# Patient Record
Sex: MALE | Race: BLACK OR AFRICAN AMERICAN | Employment: OTHER | URBAN - METROPOLITAN AREA
[De-identification: names, ages, dates, MRNs, and addresses within clinical notes are randomized per-mention and may not be internally consistent; named-entity substitution may affect disease eponyms.]

---

## 2017-01-24 ENCOUNTER — HOSPITAL ENCOUNTER (OUTPATIENT)
Dept: LAB | Age: 82
Discharge: HOME | End: 2017-01-24
Payer: MEDICARE

## 2017-01-24 ENCOUNTER — CLINICAL SUPPORT (OUTPATIENT)
Dept: UROLOGY | Age: 82
End: 2017-01-24

## 2017-01-24 VITALS
WEIGHT: 168 LBS | BODY MASS INDEX: 24.88 KG/M2 | DIASTOLIC BLOOD PRESSURE: 70 MMHG | TEMPERATURE: 97 F | SYSTOLIC BLOOD PRESSURE: 155 MMHG | HEIGHT: 69 IN | OXYGEN SATURATION: 99 % | HEART RATE: 74 BPM

## 2017-01-24 DIAGNOSIS — C61 PROSTATE CANCER (HCC): ICD-10-CM

## 2017-01-24 DIAGNOSIS — C61 PROSTATE CANCER (HCC): Primary | ICD-10-CM

## 2017-01-24 LAB
BILIRUB UR QL STRIP: NEGATIVE
GLUCOSE UR-MCNC: NEGATIVE MG/DL
KETONES P FAST UR STRIP-MCNC: NEGATIVE MG/DL
PH UR STRIP: 6 [PH] (ref 4.6–8)
PROT UR QL STRIP: NEGATIVE MG/DL
PSA SERPL-MCNC: <0.1 NG/ML (ref 0–4)
SP GR UR STRIP: 1 (ref 1–1.03)
UA UROBILINOGEN AMB POC: NORMAL (ref 0.2–1)
URINALYSIS CLARITY POC: CLEAR
URINALYSIS COLOR POC: YELLOW
URINE BLOOD POC: NEGATIVE
URINE LEUKOCYTES POC: NEGATIVE
URINE NITRITES POC: NEGATIVE

## 2017-01-24 PROCEDURE — 84153 ASSAY OF PSA TOTAL: CPT | Performed by: UROLOGY

## 2017-01-24 NOTE — MR AVS SNAPSHOT
Visit Information Date & Time Provider Department Dept. Phone Encounter #  
 1/24/2017  1:30 PM Cherylynn Dance, [facility] [phone] Upcoming Health Maintenance Date Due DTaP/Tdap/Td series (1 - Tdap) [birth year]/21/1952 ZOSTER VACCINE AGE 60> 6/21/1991 GLAUCOMA SCREENING Q2Y 6/21/1996 Pneumococcal 65+ High/Highest Risk (1 of 2 - PCV13) 6/21/1996 MEDICARE YEARLY EXAM 6/21/1996 INFLUENZA AGE 9 TO ADULT 8/1/2016 Allergies as of 1/24/2017  Review Complete On: 1/24/2017 By: Freida Nicholson LPN Severity Noted Reaction Type Reactions Nasacort [Triamcinolone Acetonide]  04/27/2011    Other (comments) Other reaction(s): other/intolerance Other reaction(s): other/intolerance Nose Bleeds Current Immunizations  Never Reviewed No immunizations on file. Not reviewed this visit You Were Diagnosed With   
  
 Codes Comments Prostate cancer [facility])    -  Primary ICD-10-CM: C06 ICD-9-CM: 686 Vitals BP Pulse Temp Height(growth percentile) Weight(growth percentile) SpO2  
 155/70 (BP 1 Location: Left arm, BP Patient Position: Sitting) 74 97 °F (36.1 °C) 5' 9\" (1.753 m) 168 lb (76.2 kg) 99% BMI Smoking Status 24.81 kg/m2 Former Smoker Vitals History BMI and BSA Data Body Mass Index Body Surface Area  
 24.81 kg/m 2 1.93 m 2 Preferred Pharmacy Pharmacy Name Phone [facility], [address] [place] [address] [phone] Your Updated Medication List  
  
   
This list is accurate as of: 1/24/17  1:51 PM.  Always use your most recent med list.  
  
  
  
  
 bicalutamide 50 mg tablet Commonly known as:  CASODEX  
take 1 tablet by mouth once daily CeleBREX 100 mg capsule Generic drug:  celecoxib Take  by mouth two (2) times a day. dilTIAZem 360 mg ER capsule Commonly known as:  CARDIZEM CD  
 TAKE ONE CAPSULE BY MOUTH EVERY MORNING  
  
 loratadine 10 mg tablet Commonly known as:  Toro Jordy Take 10 mg by mouth as needed. LORazepam 1 mg tablet Commonly known as:  ATIVAN Take 1 mg by mouth every eight (8) hours as needed. MIRALAX PO Take  by mouth as needed. potassium chloride 20 mEq packet Commonly known as:  KLOR-CON Take 20 mEq by mouth daily. triamcinolone 55 mcg nasal inhaler Commonly known as:  NASACORT AQ  
2 Sprays daily. ZOCOR 10 mg tablet Generic drug:  simvastatin Take 20 mg by mouth nightly. We Performed the Following AMB POC URINALYSIS DIP STICK AUTO W/O MICRO [64109 CPT(R)] OR COLLECTION VENOUS BLOOD,VENIPUNCTURE L1853034 CPT(R)] To-Do List   
 2017 Lab:  [facility] DIAGNOSTIC (PROSTATIC SPECIFIC AG) Patient Instructions MyChart Activation Thank you for requesting access to [facility]. Please follow the instructions below to securely access and download your online medical record. [facility] allows you to send messages to your doctor, view your test results, renew your prescriptions, schedule appointments, and more. How Do I Sign Up? 1. In your internet browser, go to [email or web address] [email or web address]. 2. Click on the First Time User? Click Here link in the Sign In box. You will see the New Member Sign Up page. 3. Enter your [facility] Access Code exactly as it appears below. You will not need to use this code after youve completed the sign-up process. If you do not sign up before the expiration date, you must request a new code. [facility] Access Code: [email or web address] Expires: 2017  1:25 PM (This is the date your [facility] access code will ) 4. Enter the last four digits of your Social Security Number (xxxx) and Date of Birth (mm/dd/yyyy) as indicated and click Submit. You will be taken to the next sign-up page. 5. Create a [employer] ID. This will be your [employer] login ID and cannot be changed, so think of one that is secure and easy to remember. 6. Create a [employer] password. You can change your password at any time. 7. Enter your Password Reset Question and Answer. This can be used at a later time if you forget your password. 8. Enter your e-mail address. You will receive e-mail notification when new information is available in [address] 9. Click Sign Up. You can now view and download portions of your medical record. 10. Click the Download Summary menu link to download a portable copy of your medical information. Additional Information If you have questions, please visit the Frequently Asked Questions section of the [employer] website at [email or web address] Remember, [employer] is NOT to be used for urgent needs. For medical emergencies, dial 911. Leuprolide (By injection) Leuprolide (DDS-hbzx-qlpi) Treats endometriosis, uterine fibroids, and premature puberty. Also treats symptoms of prostate cancer. Brand Name(s):Eligard, Lupaneta Pack, Lupron Depot, Lupron Depot-Ped There may be other brand names for this medicine. When This Medicine Should Not Be Used: This medicine is not right for everyone. You should not receive it if you had an allergic reaction to leuprolide or similar medicines, or if you are pregnant or breastfeeding. How to Use This Medicine:  
Injectable · Your doctor will prescribe your exact dose and schedule. This medicine is given as a shot into a muscle or under the skin. Leuprolide injection is given on different schedules for different conditions. It might be given every day, once a month, or every few months. · A nurse or other health provider will give you this medicine. · You may be taught how to give your medicine at home. Make sure you understand all instructions before giving yourself an injection.  Do not use more medicine or use it more often than your doctor tells you to. · You will be shown the body areas where this shot can be given. Use a different body area each time you give yourself a shot. Keep track of where you give each shot to make sure you rotate body areas. · Use a new needle and syringe each time you inject your medicine. · Read and follow the patient instructions that come with this medicine. Talk to your doctor or pharmacist if you have any questions. · Missed dose: This medicine needs to be given on a fixed schedule. If you miss a dose, call your doctor, home health caregiver, or treatment clinic for instructions. · If you store this medicine at home, keep it at room temperature, away from heat, moisture, and direct light. Drugs and Foods to Avoid: Ask your doctor or pharmacist before using any other medicine, including over-the-counter medicines, vitamins, and herbal products. · Some foods and medicines can affect how leuprolide works. Tell your doctor if you are using the following: ¨ Medicine to treat heart rhythm problems ¨ Medicine to treat depression (such as bupropion) ¨ Medicine to treat seizures ¨ Steroid medicine (such as hydrocortisone, methylprednisolone, prednisone, prednisolone, or dexamethasone) Warnings While Using This Medicine: · It is not safe to take this medicine during pregnancy. It could harm an unborn baby. Tell your doctor right away if you become pregnant. Women who are using this medicine should use birth control that does not contain hormones. · Tell your doctor if you have kidney disease, heart failure, diabetes, heart or blood vessel disease, heart rhythm problems (such as long QT syndrome), problems with your nervous system, or a history of depression, seizures, or stroke. · Women: Your menstrual periods should stop, but you might have light bleeding or spotting. If you continue to have heavy bleeding or regular periods, call your doctor. · This medicine may cause the following problems: 
¨ Heart rhythm changes ¨ Weaker bones, which may lead to osteoporosis ¨ Problems with the urinary tract or spinal cord (in men) ¨ Changes in blood sugar levels (in men) ¨ Higher risk of heart attack or stroke (in men) · Your symptoms might get worse when you first start using this medicine, but then they should get better as the medicine starts to work. If your condition does not begin to improve after 2 weeks, call your doctor. · Tell any doctor or dentist who treats you that you are using this medicine. This medicine may affect certain medical test results. · Your doctor will check your progress and the effects of this medicine at regular visits. Keep all appointments. · Keep all medicine out of the reach of children. Never share your medicine with anyone. Possible Side Effects While Using This Medicine:  
Call your doctor right away if you notice any of these side effects: · Allergic reaction: Itching or hives, swelling in your face or hands, swelling or tingling in your mouth or throat, chest tightness, trouble breathing · Change in how much or how often you urinate · Depression or severe moodiness or emotional problems · Fast, pounding, or uneven heart beat · Heavy vaginal bleeding · Seizures · Unusual or severe bone or back pain If you notice these less serious side effects, talk with your doctor:  
· Headache · Hot flashes and sweating, warmth or redness in your face, neck, arms, or upper chest 
· Loss of interest in sex, sexual problems · Moodiness · Pain, itching, burning, bruises, or swelling where the shot was given If you notice other side effects that you think are caused by this medicine, tell your doctor. Call your doctor for medical advice about side effects. You may report side effects to FDA at 8-125-FDA-8750 © 2016 [address] is for End User's use only and may not be sold, redistributed or otherwise used for commercial purposes. The above information is an  only. It is not intended as medical advice for individual conditions or treatments. Talk to your doctor, nurse or pharmacist before following any medical regimen to see if it is safe and effective for you. Introducing [facility]! Verito Strickland introduces [facility] patient portal. Now you can access parts of your medical record, email your doctor's office, and request medication refills online. 1. In your internet browser, go to [email or web address] 2. Click on the First Time User? Click Here link in the Sign In box. You will see the New Member Sign Up page. 3. Enter your [employer] Access Code exactly as it appears below. You will not need to use this code after youve completed the sign-up process. If you do not sign up before the expiration date, you must request a new code. · [facility] Access Code: [email or web address] Expires: 4/24/2017  1:25 PM 
 
4. Enter the last four digits of your Social Security Number (xxxx) and Date of Birth (mm/dd/yyyy) as indicated and click Submit. You will be taken to the next sign-up page. 5. Create a [employer] ID. This will be your [facility] login ID and cannot be changed, so think of one that is secure and easy to remember. 6. Create a [employer] password. You can change your password at any time. 7. Enter your Password Reset Question and Answer. This can be used at a later time if you forget your password. 8. Enter your e-mail address. You will receive e-mail notification when new information is available in [address] 9. Click Sign Up. You can now view and download portions of your medical record. 10. Click the Download Summary menu link to download a portable copy of your medical information.  
 
If you have questions, please visit the Frequently Asked Questions section of the [employer]. Remember, [employer] is NOT to be used for urgent needs. For medical emergencies, dial 911. Now available from your iPhone and Android! Please provide this summary of care documentation to your next provider. Your primary care clinician is listed as Aniya Santiago. If you have any questions after today's visit, please call [phone].

## 2017-01-24 NOTE — PATIENT INSTRUCTIONS
[facility] Activation    Thank you for requesting access to [facility]. Please follow the instructions below to securely access and download your online medical record. [facility] allows you to send messages to your doctor, view your test results, renew your prescriptions, schedule appointments, and more. How Do I Sign Up? 1. In your internet browser, go to [email or web address]. 2. Click on the First Time User? Click Here link in the Sign In box. You will see the New Member Sign Up page. 3. Enter your [facility] Access Code exactly as it appears below. You will not need to use this code after youve completed the sign-up process. If you do not sign up before the expiration date, you must request a new code. [facility] Access Code: [email or web address]  Expires: 2017  1:25 PM (This is the date your [facility] access code will )    4. Enter the last four digits of your Social Security Number (xxxx) and Date of Birth (mm/dd/yyyy) as indicated and click Submit. You will be taken to the next sign-up page. 5. Create a [facility] ID. This will be your [facility] login ID and cannot be changed, so think of one that is secure and easy to remember. 6. Create a [facility] password. You can change your password at any time. 7. Enter your Password Reset Question and Answer. This can be used at a later time if you forget your password. 8. Enter your e-mail address. You will receive e-mail notification when new information is available in [address] 9. Click Sign Up. You can now view and download portions of your medical record. 10. Click the Download Summary menu link to download a portable copy of your medical information. Additional Information    If you have questions, please visit the Frequently Asked Questions section of the [facility] website at [email or web address] Remember, [facility] is NOT to be used for urgent needs. For medical emergencies, dial 911.         Leuprolide (By injection) Leuprolide (KLF-gobo-hwdm)  Treats endometriosis, uterine fibroids, and premature puberty. Also treats symptoms of prostate cancer. Brand Name(s):Eligard, Lupaneta Pack, Lupron Depot, Lupron Depot-Ped   There may be other brand names for this medicine. When This Medicine Should Not Be Used: This medicine is not right for everyone. You should not receive it if you had an allergic reaction to leuprolide or similar medicines, or if you are pregnant or breastfeeding. How to Use This Medicine:   Injectable  · Your doctor will prescribe your exact dose and schedule. This medicine is given as a shot into a muscle or under the skin. Leuprolide injection is given on different schedules for different conditions. It might be given every day, once a month, or every few months. · A nurse or other health provider will give you this medicine. · You may be taught how to give your medicine at home. Make sure you understand all instructions before giving yourself an injection. Do not use more medicine or use it more often than your doctor tells you to. · You will be shown the body areas where this shot can be given. Use a different body area each time you give yourself a shot. Keep track of where you give each shot to make sure you rotate body areas. · Use a new needle and syringe each time you inject your medicine. · Read and follow the patient instructions that come with this medicine. Talk to your doctor or pharmacist if you have any questions. · Missed dose: This medicine needs to be given on a fixed schedule. If you miss a dose, call your doctor, home health caregiver, or treatment clinic for instructions. · If you store this medicine at home, keep it at room temperature, away from heat, moisture, and direct light. Drugs and Foods to Avoid:   Ask your doctor or pharmacist before using any other medicine, including over-the-counter medicines, vitamins, and herbal products.   · Some foods and medicines can affect how leuprolide works. Tell your doctor if you are using the following:  ¨ Medicine to treat heart rhythm problems  ¨ Medicine to treat depression (such as bupropion)  ¨ Medicine to treat seizures  ¨ Steroid medicine (such as hydrocortisone, methylprednisolone, prednisone, prednisolone, or dexamethasone)  Warnings While Using This Medicine:   · It is not safe to take this medicine during pregnancy. It could harm an unborn baby. Tell your doctor right away if you become pregnant. Women who are using this medicine should use birth control that does not contain hormones. · Tell your doctor if you have kidney disease, heart failure, diabetes, heart or blood vessel disease, heart rhythm problems (such as long QT syndrome), problems with your nervous system, or a history of depression, seizures, or stroke. · Women: Your menstrual periods should stop, but you might have light bleeding or spotting. If you continue to have heavy bleeding or regular periods, call your doctor. · This medicine may cause the following problems:  ¨ Heart rhythm changes  ¨ Weaker bones, which may lead to osteoporosis  ¨ Problems with the urinary tract or spinal cord (in men)  ¨ Changes in blood sugar levels (in men)  ¨ Higher risk of heart attack or stroke (in men)  · Your symptoms might get worse when you first start using this medicine, but then they should get better as the medicine starts to work. If your condition does not begin to improve after 2 weeks, call your doctor. · Tell any doctor or dentist who treats you that you are using this medicine. This medicine may affect certain medical test results. · Your doctor will check your progress and the effects of this medicine at regular visits. Keep all appointments. · Keep all medicine out of the reach of children. Never share your medicine with anyone.   Possible Side Effects While Using This Medicine:   Call your doctor right away if you notice any of these side effects:  · Allergic reaction: Itching or hives, swelling in your face or hands, swelling or tingling in your mouth or throat, chest tightness, trouble breathing  · Change in how much or how often you urinate  · Depression or severe moodiness or emotional problems  · Fast, pounding, or uneven heart beat  · Heavy vaginal bleeding  · Seizures  · Unusual or severe bone or back pain  If you notice these less serious side effects, talk with your doctor:   · Headache  · Hot flashes and sweating, warmth or redness in your face, neck, arms, or upper chest  · Loss of interest in sex, sexual problems  · Moodiness  · Pain, itching, burning, bruises, or swelling where the shot was given  If you notice other side effects that you think are caused by this medicine, tell your doctor. Call your doctor for medical advice about side effects. You may report side effects to FDA at 8-637-FDA-6349  © 2016 [address] is for End User's use only and may not be sold, redistributed or otherwise used for commercial purposes. The above information is an  only. It is not intended as medical advice for individual conditions or treatments. Talk to your doctor, nurse or pharmacist before following any medical regimen to see if it is safe and effective for you.

## 2017-01-24 NOTE — PROGRESS NOTES
Mr. Carl Buenrostro has a reminder for a \"due or due soon\" health maintenance. I have asked that he contact his primary care provider for follow-up on this health maintenance. [facility] per Dr. Pawan Murillo blood drawn in office today for PSA for Prostate CA.

## 2017-01-25 NOTE — PROGRESS NOTES
Mariano Rodrigez 80 y.o. male     Mr. Zara Soto seen today for prostate carcinoma [id number] on androgen ablation therapy with Lupron for PSA recurrence status post XRT in 2011-PSA 44.4 in May 2015 prompting initiation of androgen ablation therapy      Patient is fully active physically and has no complaints of bone pain or difficulty voiding        Now on Lupron androgen ablation for rising PSA-44.4 in may 2015-Patient is doing well and has no bone pain     Bone scan on 8 June 2015 negative for bony metastasis     CT scan imaging of the abdomen and pelvis on 14 July 2015 shows no signs of raina or bony metastasis     PSA-44.4 on 18 May 2015     Record review shows no notation of prostate biopsy      Patient was initially diagnosed with prostate carcinoma in 2008- followed in [facility] office by Dr. Marleni Franco who recommended external beam radiation therapy in 2011-this was declined by the patient After consultation with Dr. Marleni Franco and patient's family- citing belief he would not live long enough to develop metastatic disease-At present, he has irritative and obstructive voiding symptoms but no history of pain in ribs, spine, or limbs        PSA less than 0.1 in January 2016  PSA 4.8 and 2005  PSA 10.3 2008  PSA 12.1 2009- Definitive treatment with XRT advised by Dr. Azar Cuadra by patient and family  PSA 15.4 2011        Review of Systems:    CNS: No seizure syncope headaches or dizziness no visual changes  Respiratory: No wheezing or shortness of breath-Chronic cough  Cardiovascular:Hypertension-No chest pain or palpitations  Intestinal:GERD symptoms/constipation  Urinary: Irritative and obstructive voiding symptoms  Skeletal: Large joint arthritis  Endocrine:No diabetes or thyroid    Other:    Allergies:    Allergies   Allergen Reactions    Nasacort [Triamcinolone Acetonide] Other (comments)     Other reaction(s): other/intolerance  Other reaction(s): other/intolerance  Nose Bleeds Medications:    Current Outpatient Prescriptions   Medication Sig Dispense Refill    bicalutamide (CASODEX) 50 mg tablet take 1 tablet by mouth once daily 90 Tab 3    simvastatin (ZOCOR) 10 mg tablet Take 20 mg by mouth nightly.  celecoxib (CELEBREX) 100 mg capsule Take  by mouth two (2) times a day.  diltiazem (CARDIZEM CD) 360 mg ER capsule TAKE ONE CAPSULE BY MOUTH EVERY MORNING 90 Cap 3    potassium chloride (KLOR-CON) 20 mEq packet Take 20 mEq by mouth daily.  triamcinolone (NASACORT AQ) 55 mcg nasal inhaler 2 Sprays daily.  lorazepam (ATIVAN) 1 mg tablet Take 1 mg by mouth every eight (8) hours as needed.  POLYETHYLENE GLYCOL 3350 (MIRALAX PO) Take  by mouth as needed.  loratadine (CLARITIN) 10 mg tablet Take 10 mg by mouth as needed. Past Medical History   Diagnosis Date    Abnormal nuclear cardiac imaging test 04/29/2009     Mild-mod inferior, inferior septal ischemia w/mild scarring. Mild basal inferior, basal septal hypk. EF 55%. Mild TID 1.24. Neg EKG on max EST. Ex time 4 min.  Active tobacco     Allergic rhinitis     Arrhythmia     Arthritis     Atrial fibrillation (HCC)     CAD (coronary artery disease)      cardiomyopathy    Cardiomyopathy      nonischemic    Constipation     COPD     Dyslipidemia     History of echocardiogram 02/06/2013     EF 55-60%. No RWMA. Gr 1 DDfx. No significant valvular pathology.  Hypercholesterolemia     Hypertension     Hypertension     osteoarthritis     [facility] [address]     S/P cardiac cath 07/16/2009     Patent coronary arteries. LVEDP 10 mmHg. EF 40-45%. Mild global hypk.       Sick sinus syndrome ([facility] [address]      declined pacemaker      Past Surgical History   Procedure Laterality Date    Hx cataract removal       bilateral    Endoscopy, colon, diagnostic       cn he thinks 10-30 years ago    Hx orthopaedic       steel plate rt forearm tendon repair left hand     Family History   Problem Relation Age of Onset    Heart Attack Mother     Other Father      bleeding bowel problems        Physical Examination: Well-nourished mature male in no apparent distress    Urinalysis: Negative dipstick/nitrite negative    Impression: Castration responsive recurrent prostate carcinoma        Plan: Lupron 45 mg IM left buttock today    rtc 6 mo-Lupron injection      More than 1/2 of this 15 minute visit was spent in counselling and coordination of care, as described above. Mile Zhu MD  -electronically signed-    PLEASE NOTE:  This document has been produced using voice recognition software. Unrecognized errors in transcription may be present.

## 2017-07-08 RX ORDER — BICALUTAMIDE 50 MG/1
TABLET, FILM COATED ORAL
Qty: 90 TAB | Refills: 3 | Status: SHIPPED | OUTPATIENT
Start: 2017-07-08 | End: 2018-07-05 | Stop reason: SDUPTHER

## 2017-07-25 ENCOUNTER — HOSPITAL ENCOUNTER (OUTPATIENT)
Dept: LAB | Age: 82
Discharge: HOME | End: 2017-07-25
Payer: MEDICARE

## 2017-07-25 ENCOUNTER — CLINICAL SUPPORT (OUTPATIENT)
Dept: UROLOGY | Age: 82
End: 2017-07-25

## 2017-07-25 VITALS
OXYGEN SATURATION: 98 % | SYSTOLIC BLOOD PRESSURE: 120 MMHG | TEMPERATURE: 98.1 F | HEART RATE: 77 BPM | BODY MASS INDEX: 24.29 KG/M2 | HEIGHT: 69 IN | WEIGHT: 164 LBS | DIASTOLIC BLOOD PRESSURE: 80 MMHG

## 2017-07-25 DIAGNOSIS — C61 PROSTATE CANCER (HCC): Primary | ICD-10-CM

## 2017-07-25 DIAGNOSIS — C61 PROSTATE CANCER (HCC): ICD-10-CM

## 2017-07-25 LAB — PSA SERPL-MCNC: <0.1 NG/ML (ref 0–4)

## 2017-07-25 PROCEDURE — 84153 ASSAY OF PSA TOTAL: CPT | Performed by: UROLOGY

## 2017-07-25 RX ORDER — HYALURONATE SODIUM 10 MG/ML
25 SYRINGE (ML) INTRAARTICULAR ONCE
COMMUNITY

## 2017-07-25 NOTE — MR AVS SNAPSHOT
Visit Information Date & Time Provider Department Dept. Phone Encounter #  
 7/25/2017  1:45 PM Flavio Gowers, Teresa [facility] [phone] [id number] Upcoming Health Maintenance Date Due DTaP/Tdap/Td series (1 - Tdap) 6/21/1952 ZOSTER VACCINE AGE 60> 4/21/1991 GLAUCOMA SCREENING Q2Y 6/21/1996 Pneumococcal 65+ High/Highest Risk (1 of 2 - PCV13) 6/21/1996 MEDICARE YEARLY EXAM 6/21/1996 INFLUENZA AGE 9 TO ADULT 8/1/2017 Allergies as of 7/25/2017  Review Complete On: 7/25/2017 By: Dalton Lopez Severity Noted Reaction Type Reactions Nasacort [Triamcinolone Acetonide]  04/27/2011    Other (comments) Other reaction(s): other/intolerance Other reaction(s): other/intolerance Nose Bleeds Current Immunizations  Never Reviewed No immunizations on file. Not reviewed this visit You Were Diagnosed With   
  
 Codes Comments Prostate cancer [facility])    -  Primary ICD-10-CM: Z28 ICD-9-CM: 810 Vitals BP Pulse Temp Height(growth percentile) Weight(growth percentile) SpO2  
 120/80 (BP 1 Location: Left arm, BP Patient Position: Sitting) 77 98.1 °F (36.7 °C) (Oral) 5' 9\" (1.753 m) 164 lb (74.4 kg) 98% BMI Smoking Status 24.22 kg/m2 Former Smoker Vitals History BMI and BSA Data Body Mass Index Body Surface Area  
 24.22 kg/m 2 1.9 m 2 Preferred Pharmacy Pharmacy Name Phone [facility] [address] [facility], [address] [facility] [phone] Your Updated Medication List  
  
   
This list is accurate as of: 7/25/17  2:43 PM.  Always use your most recent med list.  
  
  
  
  
 bicalutamide 50 mg tablet Commonly known as:  CASODEX  
take 1 tablet by mouth once daily CeleBREX 100 mg capsule Generic drug:  celecoxib Take  by mouth two (2) times a day. dilTIAZem 360 mg ER capsule Commonly known as:  CARDIZEM CD  
 TAKE ONE CAPSULE BY MOUTH EVERY MORNING  
  
 HYALGAN 10 mg/mL Syrg injection Generic drug:  sodium hyaluronate 25 mg by Intra artICUlar route once. loratadine 10 mg tablet Commonly known as:  Viridiana Nett Take 10 mg by mouth as needed. LORazepam 1 mg tablet Commonly known as:  ATIVAN Take 1 mg by mouth every eight (8) hours as needed. MIRALAX PO Take  by mouth as needed. potassium chloride 20 mEq packet Commonly known as:  KLOR-CON Take 20 mEq by mouth daily. triamcinolone 55 mcg nasal inhaler Commonly known as:  NASACORT AQ  
2 Sprays daily. ZOCOR 10 mg tablet Generic drug:  simvastatin Take 20 mg by mouth nightly. We Performed the Following [place] COLLECTION VENOUS BLOOD,VENIPUNCTURE [id number] CPT(R)] To-Do List   
 07/25/2017 Lab:  PROSTATE SPECIFIC AG (PSA) Patient Instructions Prostate Cancer: Care Instructions Your Care Instructions The prostate gland is a small, walnut-shaped organ that lies just below a man's bladder. It surrounds the urethra, the tube that carries urine from the bladder out of the body through the penis. Prostate cancer is the abnormal growth of cells in the prostate gland. Prostate cancer cells can spread within the prostate, to nearby lymph nodes and other tissues, and to other parts of the body. When the cancer hasn't spread outside the prostate, it is called localized prostate cancer. With localized prostate cancer, your options depend on how likely it is that your cancer will grow. Tests will show if your cancer is likely to grow. · Low-risk cancer isn't likely to grow right away. If your cancer is low-risk, you can choose active surveillance. This means your cancer will be watched closely by your doctor with regular checkups and tests to see if the cancer grows. This choice allows you to delay having surgery or radiation, often for many years.  If the cancer grows very slowly, you may never need treatment. · Medium-risk cancer is more likely to grow. Some men with this type of cancer may be able to choose active surveillance. Others may need to choose surgery or radiation. · High-risk cancer is most likely to grow. If you have high-risk cancer, you will likely need to choose surgery or radiation. If your cancer has already spread outside the prostate or to other parts of the body, then you may have other treatments, like chemotherapy or hormone therapy. If you are over age [de-identified] or have other serious health problems, like heart disease, you may choose not to have treatments to cure your cancer. Instead, you can just have treatments to manage your symptoms. This is called watchful waiting. Finding out that you have cancer is scary. You may feel many emotions and may need some help coping. Seek out family, friends, and counselors for support. You also can do things at home to make yourself feel better while you go through treatment. Call the [employer] (1-536.388.4984) or visit its website at [address] for more information. Follow-up care is a key part of your treatment and safety. Be sure to make and go to all appointments, and call your doctor if you are having problems. It's also a good idea to know your test results and keep a list of the medicines you take. How can you care for yourself at home? · Your doctor will talk to you about your treatment options. You may need to learn more about each of them before you can decide which treatment is best for you. · Take your medicines exactly as prescribed. Call your doctor if you think you are having a problem with your medicine. You will get more details on the specific medicines your doctor prescribes. · Eat healthy food. If you do not feel like eating, try to eat food that has protein and extra calories to keep up your strength and prevent weight loss. Drink liquid meal replacements for extra calories and protein.  Try to eat your main meal early. · Take steps to control your stress and workload. Learn relaxation techniques. ¨ Share your feelings. Stress and tension affect our emotions. By expressing your feelings to others, you may be able to understand and cope with them. ¨ Consider joining a support group. Talking about a problem with your spouse, a good friend, or other people with similar problems is a good way to reduce tension and stress. ¨ Express yourself through art. Try writing, crafts, dance, or art to relieve stress. Some dance, writing, or art groups may be available just for people who have cancer. ¨ Be kind to your body and mind. Getting enough sleep, eating a healthy diet, and taking time to do things you enjoy can contribute to an overall feeling of balance in your life and can help reduce stress. ¨ Get help if you need it. Discuss your concerns with your doctor or counselor. · Get some physical activity every day, but do not get too tired. Keep doing the hobbies you enjoy as your energy allows. · If you are vomiting or have diarrhea: ¨ Drink plenty of fluids (enough so that your urine is light yellow or clear like water) to prevent dehydration. Choose water and other caffeine-free clear liquids. If you have kidney, heart, or liver disease and have to limit fluids, talk with your doctor before you increase the amount of fluids you drink. ¨ When you are able to eat, try clear soups, mild foods, and liquids until all symptoms are gone for 12 to 48 hours. Jell-O, dry toast, crackers, and cooked cereal are also good choices. · If you have not already done so, prepare a list of advance directives. Advance directives are instructions to your doctor and family members about what kind of care you want if you become unable to speak or express yourself. When should you call for help? Call your doctor now or seek immediate medical care if: 
· You cannot urinate. · You have symptoms of a urinary infection. For example: ¨ You have blood or pus in your urine. ¨ You have pain in your back just below your rib cage. This is called flank pain. ¨ You have a fever, chills, or body aches. ¨ It hurts to urinate. ¨ You have groin or belly pain. · You have pain in your back or hips. · Your pain is not controlled. · You are vomiting or nauseated. Watch closely for changes in your health, and be sure to contact your doctor if: 
· You have pain when you ejaculate. · You have trouble starting or controlling your urine. Where can you learn more? Go to http://ovidio-chico.info/. Enter V141 in the search box to learn more about \"Prostate Cancer: Care Instructions. \" Current as of: July 26, 2016 Content Version: 11.3 © 6854-8369 [employer]. Care instructions adapted under license by [employer] (which disclaims liability or warranty for this information). If you have questions about a medical condition or this instruction, always ask your healthcare professional. [address] any warranty or liability for your use of this information. Introducing [facility]! [facility] introduces [employer] patient portal. Now you can access parts of your medical record, email your doctor's office, and request medication refills online. 1. In your internet browser, go to [email or web address]"[email or web address]. GemShare/"[employer] 2. Click on the First Time User? Click Here link in the Sign In box. You will see the New Member Sign Up page. 3. Enter your [employer] Access Code exactly as it appears below. You will not need to use this code after youve completed the sign-up process. If you do not sign up before the expiration date, you must request a new code. · [employer] Access Code: [email or web address] Expires: 10/23/2017  1:44 PM 
 
4.  Enter the last four digits of your Social Security Number (xxxx) and Date of Birth (mm/dd/yyyy) as indicated and click Submit. You will be taken to the next sign-up page. 5. Create a Evergig ID. This will be your Evergig login ID and cannot be changed, so think of one that is secure and easy to remember. 6. Create a Evergig password. You can change your password at any time. 7. Enter your Password Reset Question and Answer. This can be used at a later time if you forget your password. 8. Enter your e-mail address. You will receive e-mail notification when new information is available in [address] 9. Click Sign Up. You can now view and download portions of your medical record. 10. Click the Download Summary menu link to download a portable copy of your medical information. If you have questions, please visit the Frequently Asked Questions section of the Evergig website. Remember, [employer] is NOT to be used for urgent needs. For medical emergencies, dial 911. Now available from your iPhone and Android! Please provide this summary of care documentation to your next provider. Your primary care clinician is listed as Johnson Chance. If you have any questions after today's visit, please call [phone].

## 2017-07-25 NOTE — PATIENT INSTRUCTIONS
Prostate Cancer: Care Instructions  Your Care Instructions    The prostate gland is a small, walnut-shaped organ that lies just below a man's bladder. It surrounds the urethra, the tube that carries urine from the bladder out of the body through the penis. Prostate cancer is the abnormal growth of cells in the prostate gland. Prostate cancer cells can spread within the prostate, to nearby lymph nodes and other tissues, and to other parts of the body. When the cancer hasn't spread outside the prostate, it is called localized prostate cancer. With localized prostate cancer, your options depend on how likely it is that your cancer will grow. Tests will show if your cancer is likely to grow. · Low-risk cancer isn't likely to grow right away. If your cancer is low-risk, you can choose active surveillance. This means your cancer will be watched closely by your doctor with regular checkups and tests to see if the cancer grows. This choice allows you to delay having surgery or radiation, often for many years. If the cancer grows very slowly, you may never need treatment. · Medium-risk cancer is more likely to grow. Some men with this type of cancer may be able to choose active surveillance. Others may need to choose surgery or radiation. · High-risk cancer is most likely to grow. If you have high-risk cancer, you will likely need to choose surgery or radiation. If your cancer has already spread outside the prostate or to other parts of the body, then you may have other treatments, like chemotherapy or hormone therapy. If you are over age [de-identified] or have other serious health problems, like heart disease, you may choose not to have treatments to cure your cancer. Instead, you can just have treatments to manage your symptoms. This is called watchful waiting. Finding out that you have cancer is scary. You may feel many emotions and may need some help coping. Seek out family, friends, and counselors for support.  You also can do things at home to make yourself feel better while you go through treatment. Call the Y[employer] Galvez (4-555.348.1162) or visit its website at [address]. [employer] for more information. Follow-up care is a key part of your treatment and safety. Be sure to make and go to all appointments, and call your doctor if you are having problems. It's also a good idea to know your test results and keep a list of the medicines you take. How can you care for yourself at home? · Your doctor will talk to you about your treatment options. You may need to learn more about each of them before you can decide which treatment is best for you. · Take your medicines exactly as prescribed. Call your doctor if you think you are having a problem with your medicine. You will get more details on the specific medicines your doctor prescribes. · Eat healthy food. If you do not feel like eating, try to eat food that has protein and extra calories to keep up your strength and prevent weight loss. Drink liquid meal replacements for extra calories and protein. Try to eat your main meal early. · Take steps to control your stress and workload. Learn relaxation techniques. ¨ Share your feelings. Stress and tension affect our emotions. By expressing your feelings to others, you may be able to understand and cope with them. ¨ Consider joining a support group. Talking about a problem with your spouse, a good friend, or other people with similar problems is a good way to reduce tension and stress. ¨ Express yourself through art. Try writing, crafts, dance, or art to relieve stress. Some dance, writing, or art groups may be available just for people who have cancer. ¨ Be kind to your body and mind. Getting enough sleep, eating a healthy diet, and taking time to do things you enjoy can contribute to an overall feeling of balance in your life and can help reduce stress. ¨ Get help if you need it.  Discuss your concerns with your doctor or counselor. · Get some physical activity every day, but do not get too tired. Keep doing the hobbies you enjoy as your energy allows. · If you are vomiting or have diarrhea:  ¨ Drink plenty of fluids (enough so that your urine is light yellow or clear like water) to prevent dehydration. Choose water and other caffeine-free clear liquids. If you have kidney, heart, or liver disease and have to limit fluids, talk with your doctor before you increase the amount of fluids you drink. ¨ When you are able to eat, try clear soups, mild foods, and liquids until all symptoms are gone for 12 to 48 hours. Jell-O, dry toast, crackers, and cooked cereal are also good choices. · If you have not already done so, prepare a list of advance directives. Advance directives are instructions to your doctor and family members about what kind of care you want if you become unable to speak or express yourself. When should you call for help? Call your doctor now or seek immediate medical care if:  · You cannot urinate. · You have symptoms of a urinary infection. For example:  ¨ You have blood or pus in your urine. ¨ You have pain in your back just below your rib cage. This is called flank pain. ¨ You have a fever, chills, or body aches. ¨ It hurts to urinate. ¨ You have groin or belly pain. · You have pain in your back or hips. · Your pain is not controlled. · You are vomiting or nauseated. Watch closely for changes in your health, and be sure to contact your doctor if:  · You have pain when you ejaculate. · You have trouble starting or controlling your urine. Where can you learn more? Go to http://ovidio-chico.info/. Enter V141 in the search box to learn more about \"Prostate Cancer: Care Instructions. \"  Current as of: July 26, 2016  Content Version: 11.3  © 2297-5773 [employer].  Care instructions adapted under license by [employer] (which disclaims liability or warranty for this information). If you have questions about a medical condition or this instruction, always ask your healthcare professional. [place] [address] any warranty or liability for your use of this information.

## 2017-07-25 NOTE — PROGRESS NOTES
Mr. Edward Morataya has a reminder for a \"due or due soon\" health maintenance. I have asked that he contact his primary care provider for follow-up on this health maintenance.

## 2017-07-25 NOTE — PROGRESS NOTES
Kobi Ely 80 y.o. male     Mr. Milagros Forman seen today for prostate carcinoma follow-up                           prostate cancer diagnosed 2008/XRT delayed until 2011    Patient is voiding well and has no complaints regarding urination at this time    No bone pain    Complaints of mild gynecomastia with mild breast tenderness      On androgen ablation therapy since May 2015 when PSA level 44.4 found on routine assessment    Bone scan negative for metastases June 2015  CT scan imaging of the abdomen pelvis negative for raina disease July 2015  Patient was initially diagnosed with prostate carcinoma in 2008- followed in [facility] office by Dr. Houston Johnston who recommended external beam radiation therapy in 2011-this was declined by the patient After consultation with Dr. Houston Johnston and patient's family- citing belief he would not live long enough to develop metastatic disease-At present, he has irritative and obstructive voiding symptoms but no history of pain in ribs, spine, or limbs      PSA less than 0.1 in January 2011    PSA less than 0.1 in January 2016  PSA 4.8 and 2005  PSA 10.3 2008  PSA 12.1 2009- Definitive treatment with XRT advised by Dr. Dora Hagen by patient and family  PSA 15.4 2011          Review of Systems:    CNS: No seizure syncope headaches or dizziness no visual changes  Respiratory: No wheezing or shortness of breath-Chronic cough  Cardiovascular:Hypertension-No chest pain or palpitations  Intestinal:GERD symptoms/constipation  Urinary: Irritative and obstructive voiding symptoms  Skeletal: Large joint arthritis  Endocrine:No diabetes or thyroid    Other:    Allergies:    Allergies   Allergen Reactions    Nasacort [Triamcinolone Acetonide] Other (comments)     Other reaction(s): other/intolerance  Other reaction(s): other/intolerance  Nose Bleeds      Medications:    Current Outpatient Prescriptions   Medication Sig Dispense Refill    sodium hyaluronate (HYALGAN) 10 mg/mL syrg injection 25 mg by Intra artICUlar route once.  bicalutamide (CASODEX) 50 mg tablet take 1 tablet by mouth once daily 90 Tab 3    simvastatin (ZOCOR) 10 mg tablet Take 20 mg by mouth nightly.  celecoxib (CELEBREX) 100 mg capsule Take  by mouth two (2) times a day.  triamcinolone (NASACORT AQ) 55 mcg nasal inhaler 2 Sprays daily.  diltiazem (CARDIZEM CD) 360 mg ER capsule TAKE ONE CAPSULE BY MOUTH EVERY MORNING 90 Cap 3    potassium chloride (KLOR-CON) 20 mEq packet Take 20 mEq by mouth daily.  POLYETHYLENE GLYCOL 3350 (MIRALAX PO) Take  by mouth as needed.  lorazepam (ATIVAN) 1 mg tablet Take 1 mg by mouth every eight (8) hours as needed.  loratadine (CLARITIN) 10 mg tablet Take 10 mg by mouth as needed. Past Medical History:   Diagnosis Date    Abnormal nuclear cardiac imaging test 04/29/2009    Mild-mod inferior, inferior septal ischemia w/mild scarring. Mild basal inferior, basal septal hypk. EF 55%. Mild TID 1.24. Neg EKG on max EST. Ex time 4 min.  Active tobacco     Allergic rhinitis     Arrhythmia     Arthritis     Atrial fibrillation (HCC)     CAD (coronary artery disease)     cardiomyopathy    Cardiomyopathy     nonischemic    Constipation     COPD     Dyslipidemia     History of echocardiogram 02/06/2013    EF 55-60%. No RWMA. Gr 1 DDfx. No significant valvular pathology.  Hypercholesterolemia     Hypertension     Hypertension     osteoarthritis     [facility] ([facility] [address]     S/P cardiac cath 07/16/2009    Patent coronary arteries. LVEDP 10 mmHg. EF 40-45%. Mild global hypk.       Sick sinus syndrome ([address]     declined pacemaker      Past Surgical History:   Procedure Laterality Date    ENDOSCOPY, COLON, DIAGNOSTIC      cn he thinks 10-30 years ago    HX CATARACT REMOVAL      bilateral    HX ORTHOPAEDIC      steel plate rt forearm tendon repair left hand     Family History   Problem Relation Age of Onset    Heart Attack Mother    Donnell Parisi Other Father      bleeding bowel problems        Physical Examination: Well-nourished mature male in no apparent distress    Chest-mild gynecomastia 3 x 2 cm bilaterally-mild tenderness    Urinalysis: No specimen today    Impression: Prostate carcinoma responding favorably to androgen ablation therapy with Casodex/Lupron                        -Gynecomastia secondary to androgen ablation therapy        Plan: Lupron 45 mg IM right buttock today             Continue Casodex 50 mg daily              Referral to radiation oncology for breast irradiation-patient declines this treatment at this time-rationale for this treatment explained to and understood by patient's daughter who accompanies him today    PSA today    Described discussed prospect of Lupron hiatus if PSA level remains negligible for the next 12 months  XRT to breast recommended now as opposed to later to prevent further enlargement of breasts-patient declines    rtc 6 mo Lupron      More than 1/2 of this 25 minute visit was spent in counselling and coordination of care, as described above. Kay Bangura MD  -electronically signed-    PLEASE NOTE:  This document has been produced using voice recognition software. Unrecognized errors in transcription may be present.

## 2018-01-25 ENCOUNTER — CLINICAL SUPPORT (OUTPATIENT)
Dept: UROLOGY | Age: 83
End: 2018-01-25

## 2018-01-25 ENCOUNTER — HOSPITAL ENCOUNTER (OUTPATIENT)
Dept: LAB | Age: 83
Discharge: HOME | End: 2018-01-25
Payer: MEDICARE

## 2018-01-25 VITALS
WEIGHT: 172 LBS | TEMPERATURE: 97.9 F | BODY MASS INDEX: 25.48 KG/M2 | OXYGEN SATURATION: 98 % | DIASTOLIC BLOOD PRESSURE: 80 MMHG | HEART RATE: 80 BPM | SYSTOLIC BLOOD PRESSURE: 120 MMHG | HEIGHT: 69 IN

## 2018-01-25 DIAGNOSIS — C61 PROSTATE CANCER (HCC): ICD-10-CM

## 2018-01-25 DIAGNOSIS — N50.89 ANDROPAUSE: Primary | ICD-10-CM

## 2018-01-25 PROCEDURE — 84153 ASSAY OF PSA TOTAL: CPT | Performed by: UROLOGY

## 2018-01-25 RX ORDER — MEGESTROL ACETATE 20 MG/1
20 TABLET ORAL DAILY
Qty: 90 TAB | Refills: 6 | Status: SHIPPED | OUTPATIENT
Start: 2018-01-25

## 2018-01-25 NOTE — PATIENT INSTRUCTIONS
Prostate Cancer: Care Instructions  Your Care Instructions    The prostate gland is a small, walnut-shaped organ that lies just below a man's bladder. It surrounds the urethra, the tube that carries urine from the bladder out of the body through the penis. Prostate cancer is the abnormal growth of cells in the prostate gland. Prostate cancer cells can spread within the prostate, to nearby lymph nodes and other tissues, and to other parts of the body. When the cancer hasn't spread outside the prostate, it is called localized prostate cancer. With localized prostate cancer, your options depend on how likely it is that your cancer will grow. Tests will show if your cancer is likely to grow. · Low-risk cancer isn't likely to grow right away. If your cancer is low-risk, you can choose active surveillance. This means your cancer will be watched closely by your doctor with regular checkups and tests to see if the cancer grows. This choice allows you to delay having surgery or radiation, often for many years. If the cancer grows very slowly, you may never need treatment. · Medium-risk cancer is more likely to grow. Some men with this type of cancer may be able to choose active surveillance. Others may need to choose surgery or radiation. · High-risk cancer is most likely to grow. If you have high-risk cancer, you will likely need to choose surgery or radiation. If your cancer has already spread outside the prostate or to other parts of the body, then you may have other treatments, like chemotherapy or hormone therapy. If you are over age [de-identified] or have other serious health problems, like heart disease, you may choose not to have treatments to cure your cancer. Instead, you can just have treatments to manage your symptoms. This is called watchful waiting. Finding out that you have cancer is scary. You may feel many emotions and may need some help coping. Seek out family, friends, and counselors for support.  You also can do things at home to make yourself feel better while you go through treatment. Call the [facility] (3-825.729.6871) or visit its website at [address] [employer] for more information. Follow-up care is a key part of your treatment and safety. Be sure to make and go to all appointments, and call your doctor if you are having problems. It's also a good idea to know your test results and keep a list of the medicines you take. How can you care for yourself at home? · Your doctor will talk to you about your treatment options. You may need to learn more about each of them before you can decide which treatment is best for you. · Take your medicines exactly as prescribed. Call your doctor if you think you are having a problem with your medicine. You will get more details on the specific medicines your doctor prescribes. · Eat healthy food. If you do not feel like eating, try to eat food that has protein and extra calories to keep up your strength and prevent weight loss. Drink liquid meal replacements for extra calories and protein. Try to eat your main meal early. · Take steps to control your stress and workload. Learn relaxation techniques. ¨ Share your feelings. Stress and tension affect our emotions. By expressing your feelings to others, you may be able to understand and cope with them. ¨ Consider joining a support group. Talking about a problem with your spouse, a good friend, or other people with similar problems is a good way to reduce tension and stress. ¨ Express yourself through art. Try writing, crafts, dance, or art to relieve stress. Some dance, writing, or art groups may be available just for people who have cancer. ¨ Be kind to your body and mind. Getting enough sleep, eating a healthy diet, and taking time to do things you enjoy can contribute to an overall feeling of balance in your life and can help reduce stress. ¨ Get help if you need it.  Discuss your concerns with your doctor or counselor. · Get some physical activity every day, but do not get too tired. Keep doing the hobbies you enjoy as your energy allows. · If you are vomiting or have diarrhea:  ¨ Drink plenty of fluids (enough so that your urine is light yellow or clear like water) to prevent dehydration. Choose water and other caffeine-free clear liquids. If you have kidney, heart, or liver disease and have to limit fluids, talk with your doctor before you increase the amount of fluids you drink. ¨ When you are able to eat, try clear soups, mild foods, and liquids until all symptoms are gone for 12 to 48 hours. Jell-O, dry toast, crackers, and cooked cereal are also good choices. · If you have not already done so, prepare a list of advance directives. Advance directives are instructions to your doctor and family members about what kind of care you want if you become unable to speak or express yourself. When should you call for help? Call 911 anytime you think you may need emergency care. For example, call if:  ? · You passed out (lost consciousness). ?Call your doctor now or seek immediate medical care if:  ? · You have new or worse pain. ? · You have new symptoms, such as a cough, belly pain, vomiting, diarrhea, or a rash. ? · You have symptoms of a urinary tract infection. For example:  ¨ You have blood or pus in your urine. ¨ You have pain in your back just below your rib cage. This is called flank pain. ¨ You have a fever, chills, or body aches. ¨ It hurts to urinate. ¨ You have groin or belly pain. ? Watch closely for changes in your health, and be sure to contact your doctor if:  ? · You have swollen glands in your armpits, groin, or neck. ? · You have trouble controlling your urine. ? · You do not get better as expected. Where can you learn more? Go to http://ovidio-chico.info/. Enter V141 in the search box to learn more about \"Prostate Cancer: Care Instructions. \"  Current as of:  May 12, 2017  Content Version: 11.4  © 2958-2653 [employer]. Care instructions adapted under license by [employer] (which disclaims liability or warranty for this information). If you have questions about a medical condition or this instruction, always ask your healthcare professional. [address] any warranty or liability for your use of this information. Leuprolide (By injection)   Leuprolide ([id number])  Treats endometriosis, uterine fibroids, and premature puberty. Also treats symptoms of prostate cancer. Brand Name(s): Eligard, Lupaneta Pack, Lupron Depot, Lupron Depot-Ped   There may be other brand names for this medicine. When This Medicine Should Not Be Used: This medicine is not right for everyone. You should not receive it if you had an allergic reaction to leuprolide or similar medicines, or if you are pregnant or breastfeeding. How to Use This Medicine:   Injectable  · Your doctor will prescribe your exact dose and schedule. This medicine is given as a shot into a muscle or under the skin. Leuprolide injection is given on different schedules for different conditions. It might be given every day, once a month, or every few months. · A nurse or other health provider will give you this medicine. · You may be taught how to give your medicine at home. Make sure you understand all instructions before giving yourself an injection. Do not use more medicine or use it more often than your doctor tells you to. · You will be shown the body areas where this shot can be given. Use a different body area each time you give yourself a shot. Keep track of where you give each shot to make sure you rotate body areas. · Use a new needle and syringe each time you inject your medicine. · This medicine should come with a Medication Guide. Ask your pharmacist for a copy if you do not have one. · Read and follow the patient instructions that come with this medicine. Talk to your doctor or pharmacist if you have any questions. · Missed dose: This medicine needs to be given on a fixed schedule. If you miss a dose, call your doctor, home health caregiver, or treatment clinic for instructions. · If you store this medicine at home, keep it at room temperature, away from heat, moisture, and direct light. Drugs and Foods to Avoid:   Ask your doctor or pharmacist before using any other medicine, including over-the-counter medicines, vitamins, and herbal products. · Some medicines can affect how leuprolide works. Tell your doctor if you are using any of the following:  ¨ Medicine to treat depression (including bupropion)  ¨ Medicine to treat heart rhythm problems  ¨ Medicine to treat seizures  ¨ Steroid medicine (including dexamethasone, hydrocortisone, methylprednisolone, prednisolone, prednisone)  Warnings While Using This Medicine:   · It is not safe to take this medicine during pregnancy. It could harm an unborn baby. Tell your doctor right away if you become pregnant. Women who are using this medicine should use birth control that does not contain hormones. · Tell your doctor if you have kidney disease, heart failure, diabetes, heart or blood vessel disease, heart rhythm problems (including long QT syndrome), problems with your nervous system, or a history of brain tumor, depression, mental illness, seizures, or stroke. · Women: Your menstrual periods should stop, but you might have light bleeding or spotting. If you continue to have heavy bleeding or regular periods, call your doctor.   · This medicine may cause the following problems:  ¨ Changes in mood or behavior  ¨ Increased risk for seizures  ¨ Heart rhythm changes  ¨ Weaker bones, which may lead to osteoporosis  ¨ Problems with the urinary tract or spinal cord (in men)  ¨ Changes in blood sugar levels (in men)  ¨ Higher risk of heart attack or stroke (in men)  · Your symptoms might get worse when you first start using this medicine, but they should get better as the medicine starts to work. If your condition does not begin to improve after 2 weeks, check with your doctor. · Tell any doctor or dentist who treats you that you are using this medicine. This medicine may affect certain medical test results. · Your doctor will check your progress and the effects of this medicine at regular visits. Keep all appointments. · Keep all medicine out of the reach of children. Never share your medicine with anyone. Possible Side Effects While Using This Medicine:   Call your doctor right away if you notice any of these side effects:  · Allergic reaction: Itching or hives, swelling in your face or hands, swelling or tingling in your mouth or throat, chest tightness, trouble breathing  · Change in how much or how often you urinate  · Depression, mood or behavior changes  · Fast, pounding, or uneven heartbeat  · Heavy vaginal bleeding  · Seizures  · Unusual or severe bone or back pain  If you notice these less serious side effects, talk with your doctor:   · Headache  · Hot flashes and sweating, warmth or redness in your face, neck, arms, or upper chest  · Loss of interest in sex, sexual problems  · Pain, itching, burning, bruises, or swelling where the shot was given  If you notice other side effects that you think are caused by this medicine, tell your doctor. Call your doctor for medical advice about side effects. You may report side effects to FDA at 8-180-FDA-1133  © 2017 [facility] Information is for End User's use only and may not be sold, redistributed or otherwise used for commercial purposes. The above information is an  only. It is not intended as medical advice for individual conditions or treatments. Talk to your doctor, nurse or pharmacist before following any medical regimen to see if it is safe and effective for you.

## 2018-01-25 NOTE — PROGRESS NOTES
Mr. Javier Kohli has a reminder for a \"due or due soon\" health maintenance. I have asked that he contact his primary care provider for follow-up on this health maintenance.

## 2018-01-25 NOTE — MR AVS SNAPSHOT
[facility] [address] [place] [address] 
[phone] Patient: Levar Butcher MRN: [id number] Visit Information Date & Time Provider Department Dept. Phone Encounter #  
 1/25/2018  1:30 PM Connie Thomas, [place]  [facility] [phone] [id number] Upcoming Health Maintenance Date Due DTaP/Tdap/Td series (1 - Tdap) 6/21/1952 ZOSTER VACCINE AGE 60> 4/21/1991 GLAUCOMA SCREENING Q2Y 6/21/1996 Pneumococcal 65+ High/Highest Risk (1 of 2 - PCV13) 6/21/1996 MEDICARE YEARLY EXAM 6/21/1996 Influenza Age 5 to Adult 8/1/2017 Allergies as of 1/25/2018  Review Complete On: 1/25/2018 By: Connie Thomas MD  
  
 Severity Noted Reaction Type Reactions Nasacort [Triamcinolone Acetonide]  04/27/2011    Other (comments) Other reaction(s): other/intolerance Other reaction(s): other/intolerance Nose Bleeds Current Immunizations  Never Reviewed No immunizations on file. Not reviewed this visit You Were Diagnosed With   
  
 Codes Comments Prostate cancer [facility])    -  Primary ICD-10-CM: Z91 ICD-9-CM: 959 Vitals BP Pulse Temp Height(growth percentile) Weight(growth percentile) SpO2  
 120/80 (BP 1 Location: Left arm, BP Patient Position: Sitting) 80 97.9 °F (36.6 °C) (Oral) 5' 9\" (1.753 m) 172 lb (78 kg) 98% BMI Smoking Status 25.4 kg/m2 Former Smoker Vitals History BMI and BSA Data Body Mass Index Body Surface Area  
 25.4 kg/m 2 1.95 m 2 Preferred Pharmacy Pharmacy Name Phone [facility] [address], [address] [phone] Your Updated Medication List  
  
   
This list is accurate as of: 1/25/18  2:20 PM.  Always use your most recent med list.  
  
  
  
  
 bicalutamide 50 mg tablet Commonly known as:  CASODEX  
take 1 tablet by mouth once daily CeleBREX 100 mg capsule Generic drug:  celecoxib Take  by mouth two (2) times a day. dilTIAZem 360 mg ER capsule Commonly known as:  CARDIZEM CD  
TAKE ONE CAPSULE BY MOUTH EVERY MORNING  
  
 HYALGAN 10 mg/mL Syrg injection Generic drug:  sodium hyaluronate 25 mg by Intra artICUlar route once. leuprolide depot 45 mg injection Commonly known as:  LUPRON 6 MONTH  
1 Each by IntraMUSCular route once for 1 dose. loratadine 10 mg tablet Commonly known as:  Freda Kady Take 10 mg by mouth as needed. LORazepam 1 mg tablet Commonly known as:  ATIVAN Take 1 mg by mouth every eight (8) hours as needed. megestrol 20 mg tablet Commonly known as:  MEGACE Take 1 Tab by mouth daily. Indications: malignant neoplasm of the ovary MIRALAX PO Take  by mouth as needed. OTHER Hyalgan 20 mg  joint lubricant  
  
 potassium chloride 20 mEq packet Commonly known as:  KLOR-CON Take 20 mEq by mouth daily. triamcinolone 55 mcg nasal inhaler Commonly known as:  NASACORT AQ  
2 Sprays daily. ZOCOR 10 mg tablet Generic drug:  simvastatin Take 20 mg by mouth nightly. Prescriptions Sent to Pharmacy Refills  
 megestrol (MEGACE) 20 mg tablet 6 Sig: Take 1 Tab by mouth daily. Indications: malignant neoplasm of the ovary Class: Normal  
 Pharmacy: [id number] [address] [place] [address] Ph #: [phone] Route: Oral  
  
We Performed the Following AMB POC URINALYSIS DIP STICK AUTO W/O MICRO [22733 CPT(R)] COLLECTION VENOUS BLOOD,VENIPUNCTURE [id number] CPT(R)] LEUPROLIDE ACETATE SUSPNSION [ [facility]] DE CHEMOTHER HORMON ANTINEOPL SUB-Q/IM [id number] CPT(R)] To-Do List   
 01/25/2018 Lab:  PSA, DIAGNOSTIC (PROSTATE SPECIFIC AG) Patient Instructions Prostate Cancer: Care Instructions Your Care Instructions The prostate gland is a small, walnut-shaped organ that lies just below a man's bladder. It surrounds the urethra, the tube that carries urine from the bladder out of the body through the penis. Prostate cancer is the abnormal growth of cells in the prostate gland. Prostate cancer cells can spread within the prostate, to nearby lymph nodes and other tissues, and to other parts of the body. When the cancer hasn't spread outside the prostate, it is called localized prostate cancer. With localized prostate cancer, your options depend on how likely it is that your cancer will grow. Tests will show if your cancer is likely to grow. · Low-risk cancer isn't likely to grow right away. If your cancer is low-risk, you can choose active surveillance. This means your cancer will be watched closely by your doctor with regular checkups and tests to see if the cancer grows. This choice allows you to delay having surgery or radiation, often for many years. If the cancer grows very slowly, you may never need treatment. · Medium-risk cancer is more likely to grow. Some men with this type of cancer may be able to choose active surveillance. Others may need to choose surgery or radiation. · High-risk cancer is most likely to grow. If you have high-risk cancer, you will likely need to choose surgery or radiation. If your cancer has already spread outside the prostate or to other parts of the body, then you may have other treatments, like chemotherapy or hormone therapy. If you are over age [de-identified] or have other serious health problems, like heart disease, you may choose not to have treatments to cure your cancer. Instead, you can just have treatments to manage your symptoms. This is called watchful waiting. Finding out that you have cancer is scary. You may feel many emotions and may need some help coping. Seek out family, friends, and counselors for support.  You also can do things at home to make yourself feel better while you go through treatment. Call the [facility] ([phone]) or visit its website at [employer] [employer] for more information. Follow-up care is a key part of your treatment and safety. Be sure to make and go to all appointments, and call your doctor if you are having problems. It's also a good idea to know your test results and keep a list of the medicines you take. How can you care for yourself at home? · Your doctor will talk to you about your treatment options. You may need to learn more about each of them before you can decide which treatment is best for you. · Take your medicines exactly as prescribed. Call your doctor if you think you are having a problem with your medicine. You will get more details on the specific medicines your doctor prescribes. · Eat healthy food. If you do not feel like eating, try to eat food that has protein and extra calories to keep up your strength and prevent weight loss. Drink liquid meal replacements for extra calories and protein. Try to eat your main meal early. · Take steps to control your stress and workload. Learn relaxation techniques. ¨ Share your feelings. Stress and tension affect our emotions. By expressing your feelings to others, you may be able to understand and cope with them. ¨ Consider joining a support group. Talking about a problem with your spouse, a good friend, or other people with similar problems is a good way to reduce tension and stress. ¨ Express yourself through art. Try writing, crafts, dance, or art to relieve stress. Some dance, writing, or art groups may be available just for people who have cancer. ¨ Be kind to your body and mind. Getting enough sleep, eating a healthy diet, and taking time to do things you enjoy can contribute to an overall feeling of balance in your life and can help reduce stress. ¨ Get help if you need it. Discuss your concerns with your doctor or counselor. · Get some physical activity every day, but do not get too tired. Keep doing the hobbies you enjoy as your energy allows. · If you are vomiting or have diarrhea: ¨ Drink plenty of fluids (enough so that your urine is light yellow or clear like water) to prevent dehydration. Choose water and other caffeine-free clear liquids. If you have kidney, heart, or liver disease and have to limit fluids, talk with your doctor before you increase the amount of fluids you drink. ¨ When you are able to eat, try clear soups, mild foods, and liquids until all symptoms are gone for 12 to 48 hours. Jell-O, dry toast, crackers, and cooked cereal are also good choices. · If you have not already done so, prepare a list of advance directives. Advance directives are instructions to your doctor and family members about what kind of care you want if you become unable to speak or express yourself. When should you call for help? Call 911 anytime you think you may need emergency care. For example, call if: 
? · You passed out (lost consciousness). ?Call your doctor now or seek immediate medical care if: 
? · You have new or worse pain. ? · You have new symptoms, such as a cough, belly pain, vomiting, diarrhea, or a rash. ? · You have symptoms of a urinary tract infection. For example: ¨ You have blood or pus in your urine. ¨ You have pain in your back just below your rib cage. This is called flank pain. ¨ You have a fever, chills, or body aches. ¨ It hurts to urinate. ¨ You have groin or belly pain. ? Watch closely for changes in your health, and be sure to contact your doctor if: 
? · You have swollen glands in your armpits, groin, or neck. ? · You have trouble controlling your urine. ? · You do not get better as expected. Where can you learn more? Go to http://ovidio-chico.info/. Enter V141 in the search box to learn more about \"Prostate Cancer: Care Instructions. \" Current as of: May 12, 2017 Content Version: 11.4 © 2442-9558 [employer]. Care instructions adapted under license by [employer] (which disclaims liability or warranty for this information). If you have questions about a medical condition or this instruction, always ask your healthcare professional. [address] any warranty or liability for your use of this information. Leuprolide (By injection) Leuprolide ([id number]-[id number]) Treats endometriosis, uterine fibroids, and premature puberty. Also treats symptoms of prostate cancer. Brand Name(s): Eligard, Lupaneta Pack, Lupron Depot, Lupron Depot-Ped There may be other brand names for this medicine. When This Medicine Should Not Be Used: This medicine is not right for everyone. You should not receive it if you had an allergic reaction to leuprolide or similar medicines, or if you are pregnant or breastfeeding. How to Use This Medicine:  
Injectable · Your doctor will prescribe your exact dose and schedule. This medicine is given as a shot into a muscle or under the skin. Leuprolide injection is given on different schedules for different conditions. It might be given every day, once a month, or every few months. · A nurse or other health provider will give you this medicine. · You may be taught how to give your medicine at home. Make sure you understand all instructions before giving yourself an injection. Do not use more medicine or use it more often than your doctor tells you to. · You will be shown the body areas where this shot can be given. Use a different body area each time you give yourself a shot. Keep track of where you give each shot to make sure you rotate body areas. · Use a new needle and syringe each time you inject your medicine. · This medicine should come with a Medication Guide. Ask your pharmacist for a copy if you do not have one. · Read and follow the patient instructions that come with this medicine. Talk to your doctor or pharmacist if you have any questions. · Missed dose: This medicine needs to be given on a fixed schedule. If you miss a dose, call your doctor, home health caregiver, or treatment clinic for instructions. · If you store this medicine at home, keep it at room temperature, away from heat, moisture, and direct light. Drugs and Foods to Avoid: Ask your doctor or pharmacist before using any other medicine, including over-the-counter medicines, vitamins, and herbal products. · Some medicines can affect how leuprolide works. Tell your doctor if you are using any of the following: ¨ Medicine to treat depression (including bupropion) ¨ Medicine to treat heart rhythm problems ¨ Medicine to treat seizures ¨ Steroid medicine (including dexamethasone, hydrocortisone, methylprednisolone, prednisolone, prednisone) Warnings While Using This Medicine: · It is not safe to take this medicine during pregnancy. It could harm an unborn baby. Tell your doctor right away if you become pregnant. Women who are using this medicine should use birth control that does not contain hormones. · Tell your doctor if you have kidney disease, heart failure, diabetes, heart or blood vessel disease, heart rhythm problems (including long QT syndrome), problems with your nervous system, or a history of brain tumor, depression, mental illness, seizures, or stroke. · Women: Your menstrual periods should stop, but you might have light bleeding or spotting. If you continue to have heavy bleeding or regular periods, call your doctor. · This medicine may cause the following problems: 
¨ Changes in mood or behavior ¨ Increased risk for seizures ¨ Heart rhythm changes ¨ Weaker bones, which may lead to osteoporosis ¨ Problems with the urinary tract or spinal cord (in men) ¨ Changes in blood sugar levels (in men) ¨ Higher risk of heart attack or stroke (in men) · Your symptoms might get worse when you first start using this medicine, but they should get better as the medicine starts to work. If your condition does not begin to improve after 2 weeks, check with your doctor. · Tell any doctor or dentist who treats you that you are using this medicine. This medicine may affect certain medical test results. · Your doctor will check your progress and the effects of this medicine at regular visits. Keep all appointments. · Keep all medicine out of the reach of children. Never share your medicine with anyone. Possible Side Effects While Using This Medicine:  
Call your doctor right away if you notice any of these side effects: · Allergic reaction: Itching or hives, swelling in your face or hands, swelling or tingling in your mouth or throat, chest tightness, trouble breathing · Change in how much or how often you urinate · Depression, mood or behavior changes · Fast, pounding, or uneven heartbeat · Heavy vaginal bleeding · Seizures · Unusual or severe bone or back pain If you notice these less serious side effects, talk with your doctor:  
· Headache · Hot flashes and sweating, warmth or redness in your face, neck, arms, or upper chest 
· Loss of interest in sex, sexual problems · Pain, itching, burning, bruises, or swelling where the shot was given If you notice other side effects that you think are caused by this medicine, tell your doctor. Call your doctor for medical advice about side effects. You may report side effects to FDA at 1-911-FDA-2513 © 2017 [address] Information is for End User's use only and may not be sold, redistributed or otherwise used for commercial purposes. The above information is an  only. It is not intended as medical advice for individual conditions or treatments. Talk to your doctor, nurse or pharmacist before following any medical regimen to see if it is safe and effective for you. Introducing [facility]! [facility] introduces Wink patient portal. Now you can access parts of your medical record, email your doctor's office, and request medication refills online. 1. In your internet browser, go to [email or web address] 2. Click on the First Time User? Click Here link in the Sign In box. You will see the New Member Sign Up page. 3. Enter your Wink Access Code exactly as it appears below. You will not need to use this code after youve completed the sign-up process. If you do not sign up before the expiration date, you must request a new code. · Wink Access Code: [email or web address] Expires: 4/25/2018  1:27 PM 
 
4. Enter the last four digits of your Social Security Number (xxxx) and Date of Birth (mm/dd/yyyy) as indicated and click Submit. You will be taken to the next sign-up page. 5. Create a Wink ID. This will be your Wink login ID and cannot be changed, so think of one that is secure and easy to remember. 6. Create a Wink password. You can change your password at any time. 7. Enter your Password Reset Question and Answer. This can be used at a later time if you forget your password. 8. Enter your e-mail address. You will receive e-mail notification when new information is available in [address] 9. Click Sign Up. You can now view and download portions of your medical record. 10. Click the Download Summary menu link to download a portable copy of your medical information. If you have questions, please visit the Frequently Asked Questions section of the Wink website. Remember, Wink is NOT to be used for urgent needs. For medical emergencies, dial 911. Now available from your iPhone and Android! Please provide this summary of care documentation to your next provider. Your primary care clinician is listed as Tara Gambino. If you have any questions after today's visit, please call [phone].

## 2018-01-26 LAB — PSA SERPL-MCNC: <0.1 NG/ML (ref 0–4)

## 2018-01-26 NOTE — PROGRESS NOTES
Rocio Amin 80 y.o. male                     prostate carcinoma 2008/XRT 2011    Mr. Calvert Po seen today for prostate carcinoma follow-up- patient is currently on androgen deprivation therapy with Lupron/Casodex   Complains of testosterone withdrawal symptoms with hot flashes, night sweats, emotional mood swings       No bone pain-voiding well and has no complaints regarding urination at this time     Complaints of mild gynecomastia with mild breast tenderness        On androgen ablation therapy since May 2015 when PSA level 44.4 found on routine assessment     Bone scan negative for metastases June 2015  CT scan imaging of the abdomen pelvis negative for raina disease July 2015  Patient was initially diagnosed with prostate carcinoma in 2008- followed in [facility] office by Dr. Donnell Mccord who recommended external beam radiation therapy in 2011-this was declined by the patient After consultation with Dr. Donnell Mccord and patient's family- citing belief he would not live long enough to develop metastatic disease-At present, he has irritative and obstructive voiding symptoms but no history of pain in ribs, spine, or limbs      PSA less than 0.1 in January 2011    PSA less than 0.1 in January 2016  PSA 4.8 and 2005  PSA 10.3 2008  PSA 12.1 2009- Definitive treatment with XRT advised by Dr. Berlin Marroquin by patient and family  PSA 15.4 2011          Review of Systems:    CNS: No seizure syncope headaches or dizziness no visual changes  Respiratory: No wheezing or shortness of breath-Chronic cough  Cardiovascular:Hypertension-No chest pain or palpitations  Intestinal:GERD symptoms/constipation  Urinary: Irritative and obstructive voiding symptoms  Skeletal: Large joint arthritis  Endocrine:No diabetes or thyroid    Other:       Allergies:    Allergies   Allergen Reactions    Nasacort [Triamcinolone Acetonide] Other (comments)     Other reaction(s): other/intolerance  Other reaction(s): other/intolerance  Nose Bleeds Medications:    Current Outpatient Prescriptions   Medication Sig Dispense Refill    leuprolide depot (LUPRON 6 MONTH) 45 mg injection 1 Each by IntraMUSCular route once for 1 dose. 1 Each 0    megestrol (MEGACE) 20 mg tablet Take 1 Tab by mouth daily. Indications: malignant neoplasm of the ovary 90 Tab 6    OTHER Hyalgan 20 mg  joint lubricant      sodium hyaluronate (HYALGAN) 10 mg/mL syrg injection 25 mg by Intra artICUlar route once.  bicalutamide (CASODEX) 50 mg tablet take 1 tablet by mouth once daily 90 Tab 3    simvastatin (ZOCOR) 10 mg tablet Take 20 mg by mouth nightly.  celecoxib (CELEBREX) 100 mg capsule Take  by mouth two (2) times a day.  triamcinolone (NASACORT AQ) 55 mcg nasal inhaler 2 Sprays daily.  diltiazem (CARDIZEM CD) 360 mg ER capsule TAKE ONE CAPSULE BY MOUTH EVERY MORNING 90 Cap 3    potassium chloride (KLOR-CON) 20 mEq packet Take 20 mEq by mouth daily.  POLYETHYLENE GLYCOL 3350 (MIRALAX PO) Take  by mouth as needed.  lorazepam (ATIVAN) 1 mg tablet Take 1 mg by mouth every eight (8) hours as needed.  loratadine (CLARITIN) 10 mg tablet Take 10 mg by mouth as needed. Past Medical History:   Diagnosis Date    Abnormal nuclear cardiac imaging test 04/29/2009    Mild-mod inferior, inferior septal ischemia w/mild scarring. Mild basal inferior, basal septal hypk. EF 55%. Mild TID 1.24. Neg EKG on max EST. Ex time 4 min.  Active tobacco     Allergic rhinitis     Arrhythmia     Arthritis     Atrial fibrillation (HCC)     CAD (coronary artery disease)     cardiomyopathy    Cardiomyopathy     nonischemic    Constipation     COPD     Dyslipidemia     History of echocardiogram 02/06/2013    EF 55-60%. No RWMA. Gr 1 DDfx. No significant valvular pathology.  Hypercholesterolemia     Hypertension     Hypertension     osteoarthritis     Prostate cancer ([facility] [address]     S/P cardiac cath 07/16/2009    Patent coronary arteries. LVEDP 10 mmHg. EF 40-45%. Mild global hypk.  Sick sinus syndrome ([address]     declined pacemaker      Past Surgical History:   Procedure Laterality Date    ENDOSCOPY, COLON, DIAGNOSTIC      cn he thinks 10-30 years ago    HX CATARACT REMOVAL      bilateral    HX ORTHOPAEDIC      steel plate rt forearm tendon repair left hand     Family History   Problem Relation Age of Onset    Heart Attack Mother     Other Father      bleeding bowel problems        Physical Examination: Well-nourished mature male in no apparent distress      Urinalysis: No specimen today    Impression: PSA positive prostate carcinoma status  post XRT 2011                      -Intolerable testosterone withdrawal symptoms-andropause      Plan: Lupron 45 mg IM left buttock today    Casodex 50 mg daily  Megace 20 mg 3 times daily #90 refill ×6    Described discussed phenomenon of testosterone withdrawal symptoms/progesterone Rx      More than 1/2 of this 25 minute visit was spent in counselling and coordination of care, as described above. Maria Luisa Paz MD  -electronically signed-    PLEASE NOTE:  This document has been produced using voice recognition software. Unrecognized errors in transcription may be present.

## 2018-07-06 RX ORDER — BICALUTAMIDE 50 MG/1
TABLET, FILM COATED ORAL
Qty: 90 TAB | Refills: 3 | Status: SHIPPED | OUTPATIENT
Start: 2018-07-06 | End: 2019-06-19 | Stop reason: SDUPTHER

## 2018-07-12 ENCOUNTER — HOSPITAL ENCOUNTER (OUTPATIENT)
Dept: LAB | Age: 83
Discharge: HOME | End: 2018-07-12
Payer: MEDICARE

## 2018-07-12 ENCOUNTER — CLINICAL SUPPORT (OUTPATIENT)
Dept: UROLOGY | Age: 83
End: 2018-07-12

## 2018-07-12 VITALS
HEART RATE: 85 BPM | HEIGHT: 69 IN | TEMPERATURE: 97.7 F | OXYGEN SATURATION: 98 % | WEIGHT: 162 LBS | DIASTOLIC BLOOD PRESSURE: 72 MMHG | SYSTOLIC BLOOD PRESSURE: 122 MMHG | BODY MASS INDEX: 23.99 KG/M2

## 2018-07-12 DIAGNOSIS — C61 PROSTATE CANCER (HCC): ICD-10-CM

## 2018-07-12 DIAGNOSIS — C61 PROSTATE CANCER (HCC): Primary | ICD-10-CM

## 2018-07-12 LAB — PSA SERPL-MCNC: <0.1 NG/ML (ref 0–4)

## 2018-07-12 PROCEDURE — 84153 ASSAY OF PSA TOTAL: CPT | Performed by: UROLOGY

## 2018-07-12 NOTE — MR AVS SNAPSHOT
[address] [facility] [address] [facility] [address] 
[phone] Patient: Komal Eugene MRN: [id number] Visit Information Date & Time Provider Department Dept. Phone Encounter #  
 7/12/2018  1:30 PM Nikki Ernst, Teresa Jackson [facility] [phone] [id number] Upcoming Health Maintenance Date Due DTaP/Tdap/Td series (1 - Tdap) 6/21/1952 ZOSTER VACCINE AGE 60> 4/21/1991 GLAUCOMA SCREENING Q2Y 6/21/1996 Pneumococcal 65+ High/Highest Risk (1 of 2 - PCV13) 6/21/1996 MEDICARE YEARLY EXAM 3/14/2018 Influenza Age 5 to Adult 8/1/2018 Allergies as of 7/12/2018  Review Complete On: 7/12/2018 By: Filbert Fothergill Severity Noted Reaction Type Reactions Nasacort [Triamcinolone Acetonide]  04/27/2011    Other (comments) Other reaction(s): other/intolerance Other reaction(s): other/intolerance Nose Bleeds Current Immunizations  Never Reviewed No immunizations on file. Not reviewed this visit You Were Diagnosed With   
  
 Codes Comments Prostate cancer [facility])    -  Primary ICD-10-CM: C66 ICD-9-CM: 758 Vitals BP Pulse Temp Height(growth percentile) Weight(growth percentile) SpO2  
 122/72 (BP 1 Location: Left arm, BP Patient Position: Sitting) 85 97.7 °F (36.5 °C) (Oral) 5' 9\" (1.753 m) 162 lb (73.5 kg) 98% BMI Smoking Status 23.92 kg/m2 Former Smoker Vitals History BMI and BSA Data Body Mass Index Body Surface Area  
 23.92 kg/m 2 1.89 m 2 Preferred Pharmacy Pharmacy Name Phone [facility] [address] [facility] [facility] [phone] Your Updated Medication List  
  
   
This list is accurate as of 7/12/18  2:42 PM.  Always use your most recent med list.  
  
  
  
  
 bicalutamide 50 mg tablet Commonly known as:  CASODEX  
take 1 tablet by mouth once daily CeleBREX 100 mg capsule Generic drug:  celecoxib Take  by mouth two (2) times a day. dilTIAZem 360 mg ER capsule Commonly known as:  CARDIZEM CD  
TAKE ONE CAPSULE BY MOUTH EVERY MORNING  
  
 HYALGAN 10 mg/mL Syrg injection Generic drug:  sodium hyaluronate 25 mg by Intra artICUlar route once. loratadine 10 mg tablet Commonly known as:  Garlan Baas Take 10 mg by mouth as needed. LORazepam 1 mg tablet Commonly known as:  ATIVAN Take 1 mg by mouth every eight (8) hours as needed. megestrol 20 mg tablet Commonly known as:  MEGACE Take 1 Tab by mouth daily. Indications: malignant neoplasm of the ovary MIRALAX PO Take  by mouth as needed. OTHER Hyalgan 20 mg  joint lubricant  
  
 potassium chloride 20 mEq packet Commonly known as:  KLOR-CON Take 20 mEq by mouth daily. triamcinolone 55 mcg nasal inhaler Commonly known as:  NASACORT AQ  
2 Sprays daily. ZOCOR 10 mg tablet Generic drug:  simvastatin Take 20 mg by mouth nightly. We Performed the Following AMB POC URINALYSIS DIP STICK AUTO W/O MICRO [11629 CPT(R)] COLLECTION VENOUS BLOOD,VENIPUNCTURE [id number] CPT(R)] Patient Instructions Leuprolide (By injection) Leuprolide (EAO-fumk-yueg) Treats endometriosis, uterine fibroids, and premature puberty. Also treats symptoms of prostate cancer. Brand Name(s): Eligard, Lupaneta Pack, Lupron Depot, Lupron Depot-Ped There may be other brand names for this medicine. When This Medicine Should Not Be Used: This medicine is not right for everyone. You should not receive it if you had an allergic reaction to leuprolide or similar medicines, or if you are pregnant or breastfeeding. How to Use This Medicine:  
Injectable · Your doctor will prescribe your exact dose and schedule. This medicine is given as a shot into a muscle or under the skin.  Leuprolide injection is given on different schedules for different conditions. It might be given every day, once a month, or every few months. · A nurse or other health provider will give you this medicine. · You may be taught how to give your medicine at home. Make sure you understand all instructions before giving yourself an injection. Do not use more medicine or use it more often than your doctor tells you to. · You will be shown the body areas where this shot can be given. Use a different body area each time you give yourself a shot. Keep track of where you give each shot to make sure you rotate body areas. · Use a new needle and syringe each time you inject your medicine. · This medicine should come with a Medication Guide. Ask your pharmacist for a copy if you do not have one. · Read and follow the patient instructions that come with this medicine. Talk to your doctor or pharmacist if you have any questions. · Missed dose: This medicine needs to be given on a fixed schedule. If you miss a dose, call your doctor, home health caregiver, or treatment clinic for instructions. · If you store this medicine at home, keep it at room temperature, away from heat, moisture, and direct light. Drugs and Foods to Avoid: Ask your doctor or pharmacist before using any other medicine, including over-the-counter medicines, vitamins, and herbal products. · Some medicines can affect how leuprolide works. Tell your doctor if you are using any of the following: ¨ Medicine to treat depression (including bupropion) ¨ Medicine to treat heart rhythm problems ¨ Medicine to treat seizures ¨ Steroid medicine (including dexamethasone, hydrocortisone, methylprednisolone, prednisolone, prednisone) Warnings While Using This Medicine: · It is not safe to take this medicine during pregnancy. It could harm an unborn baby. Tell your doctor right away if you become pregnant.  Women who are using this medicine should use birth control that does not contain hormones. · Tell your doctor if you have kidney disease, heart failure, diabetes, heart or blood vessel disease, heart rhythm problems (including long QT syndrome), problems with your nervous system, or a history of brain tumor, depression, mental illness, seizures, or stroke. · Women: Your menstrual periods should stop, but you might have light bleeding or spotting. If you continue to have heavy bleeding or regular periods, call your doctor. · This medicine may cause the following problems: 
¨ Changes in mood or behavior ¨ Increased risk for seizures ¨ Heart rhythm changes ¨ Weaker bones, which may lead to osteoporosis ¨ Problems with the urinary tract or spinal cord (in men) ¨ Changes in blood sugar levels (in men) ¨ Higher risk of heart attack or stroke (in men) · Your symptoms might get worse when you first start using this medicine, but they should get better as the medicine starts to work. If your condition does not begin to improve after 2 weeks, check with your doctor. · Tell any doctor or dentist who treats you that you are using this medicine. This medicine may affect certain medical test results. · Your doctor will check your progress and the effects of this medicine at regular visits. Keep all appointments. · Keep all medicine out of the reach of children. Never share your medicine with anyone. Possible Side Effects While Using This Medicine:  
Call your doctor right away if you notice any of these side effects: · Allergic reaction: Itching or hives, swelling in your face or hands, swelling or tingling in your mouth or throat, chest tightness, trouble breathing · Change in how much or how often you urinate · Depression, mood or behavior changes · Fast, pounding, or uneven heartbeat · Heavy vaginal bleeding · Seizures · Unusual or severe bone or back pain If you notice these less serious side effects, talk with your doctor:  
· Headache · Hot flashes and sweating, warmth or redness in your face, neck, arms, or upper chest 
· Loss of interest in sex, sexual problems · Pain, itching, burning, bruises, or swelling where the shot was given If you notice other side effects that you think are caused by this medicine, tell your doctor. Call your doctor for medical advice about side effects. You may report side effects to FDA at 0-511-IBP-0207 © 2017 [facility] Information is for End User's use only and may not be sold, redistributed or otherwise used for commercial purposes. The above information is an  only. It is not intended as medical advice for individual conditions or treatments. Talk to your doctor, nurse or pharmacist before following any medical regimen to see if it is safe and effective for you. Introducing [facility]! [employer] introduces [employer] patient portal. Now you can access parts of your medical record, email your doctor's office, and request medication refills online. 1. In your internet browser, go to [email or web address] 2. Click on the First Time User? Click Here link in the Sign In box. You will see the New Member Sign Up page. 3. Enter your [employer] Access Code exactly as it appears below. You will not need to use this code after youve completed the sign-up process. If you do not sign up before the expiration date, you must request a new code. · [employer] Access Code: [email or web address] Expires: 10/10/2018  2:42 PM 
 
4. Enter the last four digits of your Social Security Number (xxxx) and Date of Birth (mm/dd/yyyy) as indicated and click Submit. You will be taken to the next sign-up page. 5. Create a [employer] ID. This will be your [employer] login ID and cannot be changed, so think of one that is secure and easy to remember. 6. Create a [employer] password. You can change your password at any time. 7. Enter your Password Reset Question and Answer. This can be used at a later time if you forget your password. 8. Enter your e-mail address. You will receive e-mail notification when new information is available in [address] 9. Click Sign Up. You can now view and download portions of your medical record. 10. Click the Download Summary menu link to download a portable copy of your medical information. If you have questions, please visit the Frequently Asked Questions section of the [employer] website. Remember, [employer] is NOT to be used for urgent needs. For medical emergencies, dial 911. Now available from your iPhone and Android! Please provide this summary of care documentation to your next provider. Your primary care clinician is listed as Brian Lassiter. If you have any questions after today's visit, please call [phone].

## 2018-07-12 NOTE — PATIENT INSTRUCTIONS
Leuprolide (By injection)   Leuprolide (NEG-rfhp-bgyv)  Treats endometriosis, uterine fibroids, and premature puberty. Also treats symptoms of prostate cancer. Brand Name(s): Eligard, Lupaneta Pack, Lupron Depot, Lupron Depot-Ped   There may be other brand names for this medicine. When This Medicine Should Not Be Used: This medicine is not right for everyone. You should not receive it if you had an allergic reaction to leuprolide or similar medicines, or if you are pregnant or breastfeeding. How to Use This Medicine:   Injectable  · Your doctor will prescribe your exact dose and schedule. This medicine is given as a shot into a muscle or under the skin. Leuprolide injection is given on different schedules for different conditions. It might be given every day, once a month, or every few months. · A nurse or other health provider will give you this medicine. · You may be taught how to give your medicine at home. Make sure you understand all instructions before giving yourself an injection. Do not use more medicine or use it more often than your doctor tells you to. · You will be shown the body areas where this shot can be given. Use a different body area each time you give yourself a shot. Keep track of where you give each shot to make sure you rotate body areas. · Use a new needle and syringe each time you inject your medicine. · This medicine should come with a Medication Guide. Ask your pharmacist for a copy if you do not have one. · Read and follow the patient instructions that come with this medicine. Talk to your doctor or pharmacist if you have any questions. · Missed dose: This medicine needs to be given on a fixed schedule. If you miss a dose, call your doctor, home health caregiver, or treatment clinic for instructions. · If you store this medicine at home, keep it at room temperature, away from heat, moisture, and direct light.   Drugs and Foods to Avoid:   Ask your doctor or pharmacist before using any other medicine, including over-the-counter medicines, vitamins, and herbal products. · Some medicines can affect how leuprolide works. Tell your doctor if you are using any of the following:  ¨ Medicine to treat depression (including bupropion)  ¨ Medicine to treat heart rhythm problems  ¨ Medicine to treat seizures  ¨ Steroid medicine (including dexamethasone, hydrocortisone, methylprednisolone, prednisolone, prednisone)  Warnings While Using This Medicine:   · It is not safe to take this medicine during pregnancy. It could harm an unborn baby. Tell your doctor right away if you become pregnant. Women who are using this medicine should use birth control that does not contain hormones. · Tell your doctor if you have kidney disease, heart failure, diabetes, heart or blood vessel disease, heart rhythm problems (including long QT syndrome), problems with your nervous system, or a history of brain tumor, depression, mental illness, seizures, or stroke. · Women: Your menstrual periods should stop, but you might have light bleeding or spotting. If you continue to have heavy bleeding or regular periods, call your doctor. · This medicine may cause the following problems:  ¨ Changes in mood or behavior  ¨ Increased risk for seizures  ¨ Heart rhythm changes  ¨ Weaker bones, which may lead to osteoporosis  ¨ Problems with the urinary tract or spinal cord (in men)  ¨ Changes in blood sugar levels (in men)  ¨ Higher risk of heart attack or stroke (in men)  · Your symptoms might get worse when you first start using this medicine, but they should get better as the medicine starts to work. If your condition does not begin to improve after 2 weeks, check with your doctor. · Tell any doctor or dentist who treats you that you are using this medicine. This medicine may affect certain medical test results. · Your doctor will check your progress and the effects of this medicine at regular visits.  Keep all appointments. · Keep all medicine out of the reach of children. Never share your medicine with anyone. Possible Side Effects While Using This Medicine:   Call your doctor right away if you notice any of these side effects:  · Allergic reaction: Itching or hives, swelling in your face or hands, swelling or tingling in your mouth or throat, chest tightness, trouble breathing  · Change in how much or how often you urinate  · Depression, mood or behavior changes  · Fast, pounding, or uneven heartbeat  · Heavy vaginal bleeding  · Seizures  · Unusual or severe bone or back pain  If you notice these less serious side effects, talk with your doctor:   · Headache  · Hot flashes and sweating, warmth or redness in your face, neck, arms, or upper chest  · Loss of interest in sex, sexual problems  · Pain, itching, burning, bruises, or swelling where the shot was given  If you notice other side effects that you think are caused by this medicine, tell your doctor. Call your doctor for medical advice about side effects. You may report side effects to FDA at 4-910-FDA-6382  © 2017 [address] Information is for End User's use only and may not be sold, redistributed or otherwise used for commercial purposes. The above information is an  only. It is not intended as medical advice for individual conditions or treatments. Talk to your doctor, nurse or pharmacist before following any medical regimen to see if it is safe and effective for you.

## 2018-07-12 NOTE — PROGRESS NOTES
Mr. Zara Soto has a reminder for a \"due or due soon\" health maintenance. I have asked that he contact his primary care provider for follow-up on this health maintenance.

## 2018-07-13 NOTE — PROGRESS NOTES
[facility] Per Dr. Pawan Murillo order placed for Lupron Depot 45 mg for-six-months given today in office by Dr. Pawan Murillo.

## 2018-07-13 NOTE — PROGRESS NOTES
Leatha Damian 80 y.o. male    Prostate Carcinoma 2008/XRT 2011     Alessandro Adamson seen today for prostate carcinoma ikkaqi-bs-ewfofyfih on androgen ablation therapy with Lupron/Casodex     No bone pain-voiding well and has no complaints regarding urination at this time      Complaints of mild gynecomastia with mild breast tenderness          On androgen ablation therapy since May 2015 when PSA level 44.4 found on routine assessment      Bone scan negative for metastases June 2015  CT scan imaging of the abdomen pelvis negative for raina disease July 2015  Patient was initially diagnosed with prostate carcinoma in 2008- followed in [facility] office by Dr. Laci Santamaria who recommended external beam radiation therapy in 2011-this was declined by the patient After consultation with Dr. Laci Santamaria and patient's family- citing belief he would not live long enough to develop metastatic disease-At present, he has irritative and obstructive voiding symptoms but no history of pain in ribs, spine, or limbs      PSA less than 0.1 in January 2011    PSA less than 0.1 in January 2016  PSA 4.8 and 2005  PSA 10.3 2008  PSA 12.1 2009-                                                      Definitive treatment with XRT advised by Dr. Odessa Small by patient and family  PSA 15.4 2011                                                       Definitive XRT 2011  PSA less than 0.1 in January 2011  PSA 0.4 in 2015                                                     Androgen ablation therapy initiated 2015  PSA less than 0.1 in January 2016  PSA less than 0.1 in January 2018          Review of Systems:    CNS: No seizure syncope headaches or dizziness no visual changes  Respiratory: No wheezing or shortness of breath-Chronic cough  Cardiovascular:Hypertension-No chest pain or palpitations  Intestinal:GERD symptoms/constipation  Urinary: Irritative and obstructive voiding symptoms  Skeletal: Large joint arthritis  Endocrine:No diabetes or thyroid    Other:     Allergies: Allergies   Allergen Reactions    Nasacort [Triamcinolone Acetonide] Other (comments)     Other reaction(s): other/intolerance  Other reaction(s): other/intolerance  Nose Bleeds      Medications:    Current Outpatient Prescriptions   Medication Sig Dispense Refill    bicalutamide (CASODEX) 50 mg tablet take 1 tablet by mouth once daily 90 Tab 3    sodium hyaluronate (HYALGAN) 10 mg/mL syrg injection 25 mg by Intra artICUlar route once.  simvastatin (ZOCOR) 10 mg tablet Take 20 mg by mouth nightly.  celecoxib (CELEBREX) 100 mg capsule Take  by mouth two (2) times a day.  triamcinolone (NASACORT AQ) 55 mcg nasal inhaler 2 Sprays daily.  diltiazem (CARDIZEM CD) 360 mg ER capsule TAKE ONE CAPSULE BY MOUTH EVERY MORNING 90 Cap 3    potassium chloride (KLOR-CON) 20 mEq packet Take 20 mEq by mouth daily.  POLYETHYLENE GLYCOL 3350 (MIRALAX PO) Take  by mouth as needed.  loratadine (CLARITIN) 10 mg tablet Take 10 mg by mouth as needed.  megestrol (MEGACE) 20 mg tablet Take 1 Tab by mouth daily. Indications: malignant neoplasm of the ovary 90 Tab 6    OTHER Hyalgan 20 mg  joint lubricant      lorazepam (ATIVAN) 1 mg tablet Take 1 mg by mouth every eight (8) hours as needed. Past Medical History:   Diagnosis Date    Abnormal nuclear cardiac imaging test 04/29/2009    Mild-mod inferior, inferior septal ischemia w/mild scarring. Mild basal inferior, basal septal hypk. EF 55%. Mild TID 1.24. Neg EKG on max EST. Ex time 4 min.  Active tobacco     Allergic rhinitis     Arrhythmia     Arthritis     Atrial fibrillation (HCC)     CAD (coronary artery disease)     cardiomyopathy    Cardiomyopathy     nonischemic    Constipation     COPD     Dyslipidemia     History of echocardiogram 02/06/2013    EF 55-60%. No RWMA. Gr 1 DDfx. No significant valvular pathology.     Hypercholesterolemia     Hypertension     Hypertension     osteoarthritis     [facility] ([facility] [address]     S/P cardiac cath 07/16/2009    Patent coronary arteries. LVEDP 10 mmHg. EF 40-45%. Mild global hypk.  Sick sinus syndrome ([facility] [address]     declined pacemaker      Past Surgical History:   Procedure Laterality Date    ENDOSCOPY, COLON, DIAGNOSTIC      cn he thinks 10-30 years ago    HX CATARACT REMOVAL      bilateral    HX ORTHOPAEDIC      steel plate rt forearm tendon repair left hand     Family History   Problem Relation Age of Onset    Heart Attack Mother     Other Father      bleeding bowel problems        Physical Examination: Well-nourished mature male in no apparent distress    Urinalysis: No specimen today    Impression: Prostate carcinoma 7 years status post XRT with favorable response to Lupron therapy for post treatment PSA positive disease    Patient has had 0 level PSA readings since 2015 while on Lupron/Casodex androgen ablation therapy        Plan: Lupron hiatus    Hold Lupron injection today  PSA today    Described discussed rationale for prone hiatus  management of PSA recurrent prostate carcinoma-along with rationale for doing of Lupron therapy    RTC 6 months PSA      More than 1/2 of this 25 minute visit was spent in counselling and coordination of care, as described above. Claire Reyes MD  -electronically signed-    PLEASE NOTE:  This document has been produced using voice recognition software. Unrecognized errors in transcription may be present.

## 2018-08-19 ENCOUNTER — APPOINTMENT (OUTPATIENT)
Dept: GENERAL RADIOLOGY | Age: 83
End: 2018-08-19
Attending: EMERGENCY MEDICINE
Payer: MEDICARE

## 2018-08-19 ENCOUNTER — HOSPITAL ENCOUNTER (EMERGENCY)
Age: 83
Discharge: HOME | End: 2018-08-19
Attending: EMERGENCY MEDICINE | Admitting: EMERGENCY MEDICINE
Payer: MEDICARE

## 2018-08-19 VITALS
SYSTOLIC BLOOD PRESSURE: 174 MMHG | RESPIRATION RATE: 12 BRPM | OXYGEN SATURATION: 96 % | HEIGHT: 69 IN | HEART RATE: 82 BPM | WEIGHT: 170 LBS | TEMPERATURE: 98.2 F | DIASTOLIC BLOOD PRESSURE: 80 MMHG | BODY MASS INDEX: 25.18 KG/M2

## 2018-08-19 DIAGNOSIS — R55 NEAR SYNCOPE: Primary | ICD-10-CM

## 2018-08-19 LAB
ANION GAP SERPL CALC-SCNC: 8 MMOL/L (ref 3–18)
ATRIAL RATE: 86 BPM
BASOPHILS # BLD: 0 K/UL (ref 0–0.1)
BASOPHILS NFR BLD: 1 % (ref 0–2)
BUN SERPL-MCNC: 16 MG/DL (ref 7–18)
BUN/CREAT SERPL: 15 (ref 12–20)
CALCIUM SERPL-MCNC: 9.1 MG/DL (ref 8.5–10.1)
CALCULATED P AXIS, ECG09: 96 DEGREES
CALCULATED R AXIS, ECG10: -10 DEGREES
CALCULATED T AXIS, ECG11: 43 DEGREES
CHLORIDE SERPL-SCNC: 105 MMOL/L (ref 100–108)
CK MB CFR SERPL CALC: 1.3 % (ref 0–4)
CK MB SERPL-MCNC: 1.5 NG/ML (ref 5–25)
CK SERPL-CCNC: 114 U/L (ref 39–308)
CO2 SERPL-SCNC: 27 MMOL/L (ref 21–32)
CREAT SERPL-MCNC: 1.06 MG/DL (ref 0.6–1.3)
DIAGNOSIS, 93000: NORMAL
DIFFERENTIAL METHOD BLD: ABNORMAL
EOSINOPHIL # BLD: 0.4 K/UL (ref 0–0.4)
EOSINOPHIL NFR BLD: 5 % (ref 0–5)
ERYTHROCYTE [DISTWIDTH] IN BLOOD BY AUTOMATED COUNT: 16.2 % (ref 11.6–14.5)
GLUCOSE SERPL-MCNC: 131 MG/DL (ref 74–99)
HCT VFR BLD AUTO: 39.2 % (ref 36–48)
HGB BLD-MCNC: 13 G/DL (ref 13–16)
LYMPHOCYTES # BLD: 3.2 K/UL (ref 0.9–3.6)
LYMPHOCYTES NFR BLD: 38 % (ref 21–52)
MAGNESIUM SERPL-MCNC: 2.3 MG/DL (ref 1.6–2.6)
MCH RBC QN AUTO: 30.8 PG (ref 24–34)
MCHC RBC AUTO-ENTMCNC: 33.2 G/DL (ref 31–37)
MCV RBC AUTO: 92.9 FL (ref 74–97)
MONOCYTES # BLD: 0.7 K/UL (ref 0.05–1.2)
MONOCYTES NFR BLD: 8 % (ref 3–10)
NEUTS SEG # BLD: 4.1 K/UL (ref 1.8–8)
NEUTS SEG NFR BLD: 48 % (ref 40–73)
P-R INTERVAL, ECG05: 168 MS
PLATELET # BLD AUTO: 226 K/UL (ref 135–420)
PMV BLD AUTO: 9.2 FL (ref 9.2–11.8)
POTASSIUM SERPL-SCNC: 3.8 MMOL/L (ref 3.5–5.5)
Q-T INTERVAL, ECG07: 382 MS
QRS DURATION, ECG06: 102 MS
QTC CALCULATION (BEZET), ECG08: 457 MS
RBC # BLD AUTO: 4.22 M/UL (ref 4.7–5.5)
SODIUM SERPL-SCNC: 140 MMOL/L (ref 136–145)
TROPONIN I SERPL-MCNC: <0.02 NG/ML (ref 0–0.06)
VENTRICULAR RATE, ECG03: 86 BPM
WBC # BLD AUTO: 8.5 K/UL (ref 4.6–13.2)

## 2018-08-19 PROCEDURE — 82550 ASSAY OF CK (CPK): CPT | Performed by: EMERGENCY MEDICINE

## 2018-08-19 PROCEDURE — 96360 HYDRATION IV INFUSION INIT: CPT

## 2018-08-19 PROCEDURE — 93005 ELECTROCARDIOGRAM TRACING: CPT

## 2018-08-19 PROCEDURE — 83735 ASSAY OF MAGNESIUM: CPT | Performed by: EMERGENCY MEDICINE

## 2018-08-19 PROCEDURE — 71045 X-RAY EXAM CHEST 1 VIEW: CPT

## 2018-08-19 PROCEDURE — 74011250636 HC RX REV CODE- 250/636: Performed by: EMERGENCY MEDICINE

## 2018-08-19 PROCEDURE — 85025 COMPLETE CBC W/AUTO DIFF WBC: CPT | Performed by: EMERGENCY MEDICINE

## 2018-08-19 PROCEDURE — 99285 EMERGENCY DEPT VISIT HI MDM: CPT

## 2018-08-19 PROCEDURE — 80048 BASIC METABOLIC PNL TOTAL CA: CPT | Performed by: EMERGENCY MEDICINE

## 2018-08-19 RX ADMIN — SODIUM CHLORIDE 500 ML: 900 INJECTION, SOLUTION INTRAVENOUS at 02:23

## 2018-08-19 NOTE — DISCHARGE INSTRUCTIONS
Lightheadedness or Faintness: Care Instructions  Your Care Instructions  Lightheadedness is a feeling that you are about to faint or \"pass out. \" You do not feel as if you or your surroundings are moving. It is different from vertigo, which is the feeling that you or things around you are spinning or tilting. Lightheadedness usually goes away or gets better when you lie down. If lightheadedness gets worse, it can lead to a fainting spell. It is common to feel lightheaded from time to time. Lightheadedness usually is not caused by a serious problem. It often is caused by a short-lasting drop in blood pressure and blood flow to your head that occurs when you get up too quickly from a seated or lying position. Follow-up care is a key part of your treatment and safety. Be sure to make and go to all appointments, and call your doctor if you are having problems. It's also a good idea to know your test results and keep a list of the medicines you take. How can you care for yourself at home? · Lie down for 1 or 2 minutes when you feel lightheaded. After lying down, sit up slowly and remain sitting for 1 to 2 minutes before slowly standing up. · Avoid movements, positions, or activities that have made you lightheaded in the past.  · Get plenty of rest, especially if you have a cold or flu, which can cause lightheadedness. · Make sure you drink plenty of fluids, especially if you have a fever or have been sweating. · Do not drive or put yourself and others in danger while you feel lightheaded. When should you call for help? Call 911 anytime you think you may need emergency care. For example, call if:    · You have symptoms of a stroke. These may include:  ¨ Sudden numbness, tingling, weakness, or loss of movement in your face, arm, or leg, especially on only one side of your body. ¨ Sudden vision changes. ¨ Sudden trouble speaking. ¨ Sudden confusion or trouble understanding simple statements.   ¨ Sudden problems with walking or balance. ¨ A sudden, severe headache that is different from past headaches.     · You have symptoms of a heart attack. These may include:  ¨ Chest pain or pressure, or a strange feeling in the chest.  ¨ Sweating. ¨ Shortness of breath. ¨ Nausea or vomiting. ¨ Pain, pressure, or a strange feeling in the back, neck, jaw, or upper belly or in one or both shoulders or arms. ¨ Lightheadedness or sudden weakness. ¨ A fast or irregular heartbeat. After you call 911, the  may tell you to chew 1 adult-strength or 2 to 4 low-dose aspirin. Wait for an ambulance. Do not try to drive yourself.    Watch closely for changes in your health, and be sure to contact your doctor if:    · Your lightheadedness gets worse or does not get better with home care. Where can you learn more? Go to http://ovidio-chico.info/. Enter D646 in the search box to learn more about \"Lightheadedness or Faintness: Care Instructions. \"  Current as of: November 20, 2017  Content Version: 11.7  © 1748-3928 Figure 1. Care instructions adapted under license by [employer] (which disclaims liability or warranty for this information). If you have questions about a medical condition or this instruction, always ask your healthcare professional. [address] any warranty or liability for your use of this information. Vasovagal Syncope: Care Instructions  Your Care Instructions    Vasovagal syncope (say \"sgi-[id number] [id number]\")is sudden dizziness or fainting that can be set off by things such as pain, stress, fear, or trauma. You may sweat or feel lightheaded, sick to your stomach, or tingly. The problem causes the heart rate to slow and the blood vessels to widen, or dilate, for a short time. When this happens, blood pools in the lower body, and less blood goes to the brain.   You can usually get relief by lying down with your legs raised (elevated). This helps more blood to flow to your brain and may help relieve symptoms like feeling dizzy. Some doctors may recommend a technique that involves tensing your fists and arms. This type of fainting is often easy to predict. For example, it happens to some people when they see blood or have to get a shot. They may feel symptoms before they faint. An episode of vasovagal syncope usually responds well to self-care. Other treatment often isn't needed. But if the fainting keeps happening, your doctor may suggest further treatments. Follow-up care is a key part of your treatment and safety. Be sure to make and go to all appointments, and call your doctor if you are having problems. It's also a good idea to know your test results and keep a list of the medicines you take. How can you care for yourself at home? · Drink plenty of fluids to prevent dehydration. If you have kidney, heart, or liver disease and have to limit fluids, talk with your doctor before you increase your fluid intake. · Try to avoid things that you think may set off vasovagal syncope. · Talk to your doctor about any medicines you take. Some medicines may increase the chance of this condition occurring. · If you feel symptoms, lie down with your legs raised. Talk to your doctor about what to do if your symptoms come back. When should you call for help? Call 911 anytime you think you may need emergency care. For example, call if:    · You have symptoms of a heart problem. These may include:  ¨ Chest pain or pressure. ¨ Severe trouble breathing. ¨ A fast or irregular heartbeat.    Watch closely for changes in your health, and be sure to contact your doctor if:    · You have more episodes of fainting at home.     · You do not get better as expected. Where can you learn more? Go to http://ovidio-chico.info/. Enter L754 in the search box to learn more about \"Vasovagal Syncope: Care Instructions. \"  Current as of: November 20, 2017  Content Version: 11.7  © 3448-8783 [employer]. Care instructions adapted under license by [employer] (which disclaims liability or warranty for this information). If you have questions about a medical condition or this instruction, always ask your healthcare professional. [address] any warranty or liability for your use of this information.

## 2018-08-19 NOTE — ED TRIAGE NOTES
Pt reports sudden onset of dizziness while walking to bathroom. Assisted himself to ground. Denies any pain or discomfort.

## 2018-08-19 NOTE — ED PROVIDER NOTES
HPI Comments: Sameer Chavez is a 80 y.o. Male with a PMH of HTN, a fib, nonischemic cardiomyopathy, and SSS coming in by EMS for dizziness and near syncope. Patient states that he got up in the middle of the night to use the restroom, he urinated and then once he finished suddenly became very dizzy and lightheaded. He lowered himself to the ground but does not think that he fully lost consciousness. Denies any pain or trauma, states that he did not actually fall or injure himself. Denies preceding or current CP, SOB, palpitations, numbness, or weakness. Denies change in medications, N/V/D or other complaints. Currently feels back to normal. States about 15 minutes from onset of symptoms until complete resolution. Past Medical History:   Diagnosis Date    Abnormal nuclear cardiac imaging test 04/29/2009    Mild-mod inferior, inferior septal ischemia w/mild scarring. Mild basal inferior, basal septal hypk. EF 55%. Mild TID 1.24. Neg EKG on max EST. Ex time 4 min.  Active tobacco     Allergic rhinitis     Arrhythmia     Arthritis     Atrial fibrillation (HCC)     CAD (coronary artery disease)     cardiomyopathy    Cardiomyopathy     nonischemic    Constipation     COPD     Dyslipidemia     History of echocardiogram 02/06/2013    EF 55-60%. No RWMA. Gr 1 DDfx. No significant valvular pathology.  Hypercholesterolemia     Hypertension     Hypertension     osteoarthritis     Prostate cancer ([facility] [address]     S/P cardiac cath 07/16/2009    Patent coronary arteries. LVEDP 10 mmHg. EF 40-45%. Mild global hypk.       Sick sinus syndrome ([address]     declined pacemaker       Past Surgical History:   Procedure Laterality Date    ENDOSCOPY, COLON, DIAGNOSTIC      cn he thinks 10-30 years ago    HX CATARACT REMOVAL      bilateral    HX ORTHOPAEDIC      steel plate rt forearm tendon repair left hand         Family History:   Problem Relation Age of Onset    Heart Attack Mother    [facility] Other Father bleeding bowel problems       Social History     Social History    Marital status:      Spouse name: N/A    Number of children: N/A    Years of education: N/A     Occupational History    Not on file. Social History Main Topics    Smoking status: Former Smoker    Smokeless tobacco: Never Used    Alcohol use No      Comment: Quit drinking liquor and beer 2000    Drug use: No    Sexual activity: Not on file     Other Topics Concern    Not on file     Social History Narrative         ALLERGIES: Nasacort [triamcinolone acetonide]    Review of Systems   Constitutional: Negative. Negative for chills and fever. Eyes: Negative. Negative for visual disturbance. Respiratory: Negative. Negative for shortness of breath. Cardiovascular: Negative. Negative for chest pain and leg swelling. Gastrointestinal: Negative. Negative for abdominal pain, diarrhea and vomiting. Genitourinary: Negative. Negative for dysuria. Musculoskeletal: Negative. Negative for back pain and myalgias. Skin: Negative. Negative for rash and wound. Neurological: Positive for dizziness and light-headedness. Negative for weakness. Psychiatric/Behavioral: Negative. Negative for self-injury. All other systems reviewed and are negative. Vitals:    08/19/18 0239 08/19/18 0245 08/19/18 0246 08/19/18 0304   BP: 151/90 166/87  174/80   Pulse: 88  84 82   Resp:   16 12   Temp:       SpO2:   94% 96%   Weight:       Height:                Physical Exam   Constitutional: He is oriented to person, place, and time. He appears well-developed and well-nourished. No distress. HENT:   Head: Normocephalic and atraumatic. Eyes: Conjunctivae and EOM are normal. Pupils are equal, round, and reactive to light. Neck: Normal range of motion. Neck supple. No JVD present. Cardiovascular: Normal rate, regular rhythm, S1 normal and S2 normal.  Exam reveals no gallop and no friction rub.     No murmur heard.  Pulmonary/Chest: Effort normal and breath sounds normal. No accessory muscle usage. No respiratory distress. Abdominal: Soft. Normal appearance. He exhibits no distension. There is no tenderness. There is no rigidity, no rebound and no guarding. Musculoskeletal: Normal range of motion. He exhibits no edema or tenderness. Neurological: He is alert and oriented to person, place, and time. He has normal strength. No cranial nerve deficit or sensory deficit. Coordination normal.   Skin: Skin is warm and intact. No rash noted. Psychiatric: He has a normal mood and affect. His speech is normal and behavior is normal.   Vitals reviewed. MDM  Number of Diagnoses or Management Options  Near syncope:   Diagnosis management comments: Matt Perera is a 80 y.o. Male coming in after what sounds like a near syncopal event just after micturition. Likely vasovagal given timing. Documented hx of a fib and on diltiazem, but no blood thinners. In NSR on monitor and EKG. Will keep on monitor and get basic labs and observe.         ED Course       Procedures    Vitals:  Patient Vitals for the past 12 hrs:   Temp Pulse Resp BP SpO2   08/19/18 0304 - 82 12 174/80 96 %   08/19/18 0246 - 84 16 - 94 %   08/19/18 0245 - - - 166/87 -   08/19/18 0239 - 88 - 151/90 -   08/19/18 0233 - 90 15 175/83 96 %   08/19/18 0232 - 87 14 166/86 97 %   08/19/18 0230 - 82 14 157/75 96 %   08/19/18 0226 - 85 13 151/90 94 %   08/19/18 0221 98.2 °F (36.8 °C) 88 18 172/84 99 %       Medications ordered:   Medications   sodium chloride 0.9 % bolus infusion 500 mL (0 mL IntraVENous IV Completed 8/19/18 0319)         Lab findings:  Recent Results (from the past 12 hour(s))   EKG, 12 LEAD, INITIAL    Collection Time: 08/19/18  2:22 AM   Result Value Ref Range    Ventricular Rate 86 BPM    Atrial Rate 86 BPM    P-R Interval 168 ms    QRS Duration 102 ms    Q-T Interval 382 ms    QTC Calculation (Bezet) 457 ms    Calculated P Axis 96 degrees Calculated R Axis -10 degrees    Calculated T Axis 43 degrees    Diagnosis       Sinus rhythm with fusion complexes  Incomplete right bundle branch block  Borderline ECG  When compared with ECG of 17-SEP-2009 08:15,  Sinus rhythm has replaced Atrial flutter     CBC WITH AUTOMATED DIFF    Collection Time: 08/19/18  2:30 AM   Result Value Ref Range    WBC 8.5 4.6 - 13.2 K/uL    RBC 4.22 (L) 4.70 - 5.50 M/uL    HGB 13.0 13.0 - 16.0 g/dL    HCT 39.2 36.0 - 48.0 %    MCV 92.9 74.0 - 97.0 FL    MCH 30.8 24.0 - 34.0 PG    MCHC 33.2 31.0 - 37.0 g/dL    RDW 16.2 (H) 11.6 - 14.5 %    PLATELET 109 817 - 339 K/uL    MPV 9.2 9.2 - 11.8 FL    NEUTROPHILS 48 40 - 73 %    LYMPHOCYTES 38 21 - 52 %    MONOCYTES 8 3 - 10 %    EOSINOPHILS 5 0 - 5 %    BASOPHILS 1 0 - 2 %    ABS. NEUTROPHILS 4.1 1.8 - 8.0 K/UL    ABS. LYMPHOCYTES 3.2 0.9 - 3.6 K/UL    ABS. MONOCYTES 0.7 0.05 - 1.2 K/UL    ABS. EOSINOPHILS 0.4 0.0 - 0.4 K/UL    ABS. BASOPHILS 0.0 0.0 - 0.1 K/UL    DF AUTOMATED     METABOLIC PANEL, BASIC    Collection Time: 08/19/18  2:30 AM   Result Value Ref Range    Sodium 140 136 - 145 mmol/L    Potassium 3.8 3.5 - 5.5 mmol/L    Chloride 105 100 - 108 mmol/L    CO2 27 21 - 32 mmol/L    Anion gap 8 3.0 - 18 mmol/L    Glucose 131 (H) 74 - 99 mg/dL    BUN 16 7.0 - 18 MG/DL    Creatinine 1.06 0.6 - 1.3 MG/DL    BUN/Creatinine ratio 15 12 - 20      GFR est AA >60 >60 ml/min/1.73m2    GFR est non-AA >60 >60 ml/min/1.73m2    Calcium 9.1 8.5 - 10.1 MG/DL   MAGNESIUM    Collection Time: 08/19/18  2:30 AM   Result Value Ref Range    Magnesium 2.3 1.6 - 2.6 mg/dL   CARDIAC PANEL,(CK, CKMB & TROPONIN)    Collection Time: 08/19/18  2:30 AM   Result Value Ref Range     39 - 308 U/L    CK - MB 1.5 <3.6 ng/ml    CK-MB Index 1.3 0.0 - 4.0 %    Troponin-I, Qt. <0.02 0.00 - 0.06 NG/ML       EKG interpretation by ED Physician: NSR rate of 86 BMP, incomplete RBBB pattern consistent with prior EKGs.  No evidence of WPW, HOCM, Brugada, prolonged QTc, or acute ischemia on ekg. X-Ray, CT or other radiology findings or impressions:  XR CHEST PORT   Final Result      No acute process    Progress notes, Consult notes or additional Procedure notes:     Reevaluation of patient:   I have reassessed the patient. Patient has remained completely asymptomatic here in the ED. No hypotension or bradycardia. Has maintained in NSR without evidence of heart block or a fib on monitor. Orthostatic vital signs negative. Was able to get up and ambulate around the ED without any lightheadedness or difficulty. I suspect given the timing related to urination this is likely due to vasovagal syncope. Patient has previously been evaluated by cardiology for SSS, but did not and still does not want a pace maker or AICD. Will refer back to cardiology as an outpatient, but I have a very low suspicion of malignant arrhythmia as a cause of his symptoms at this time. All results and plan for follow up was discussed with patient and family. Strict return precautions were given for any reoccurrence of symptoms and patient and family in agreement with plan. Disposition:  Diagnosis:   1. Near syncope        Disposition: Discharged    Follow-up Information     Follow up With Details Comments Contact Info    Alessandro Mcnulty MD Schedule an appointment as soon as possible for a visit for office follow up [address] [facility]  [phone]      Anup Hedrick MD Schedule an appointment as soon as possible for a visit for office follow up [address]   [address]      [address] [facility] EMERGENCY DEPT  As needed, If symptoms worsen [address] [facility]  [phone]           Patient's Medications   Start Taking    No medications on file   Continue Taking    BICALUTAMIDE (CASODEX) 50 MG TABLET    take 1 tablet by mouth once daily    CELECOXIB (CELEBREX) 100 MG CAPSULE    Take  by mouth two (2) times a day. DILTIAZEM (CARDIZEM CD) 360 MG ER CAPSULE    TAKE ONE CAPSULE BY MOUTH EVERY MORNING    LORATADINE (CLARITIN) 10 MG TABLET    Take 10 mg by mouth as needed. LORAZEPAM (ATIVAN) 1 MG TABLET    Take 1 mg by mouth every eight (8) hours as needed. MEGESTROL (MEGACE) 20 MG TABLET    Take 1 Tab by mouth daily. Indications: malignant neoplasm of the ovary    OTHER    Hyalgan 20 mg  joint lubricant    POLYETHYLENE GLYCOL 3350 (MIRALAX PO)    Take  by mouth as needed. POTASSIUM CHLORIDE (KLOR-CON) 20 MEQ PACKET    Take 20 mEq by mouth daily. SIMVASTATIN (ZOCOR) 10 MG TABLET    Take 20 mg by mouth nightly. SODIUM HYALURONATE (HYALGAN) 10 MG/ML SYRG INJECTION    25 mg by Intra artICUlar route once. TRIAMCINOLONE (NASACORT AQ) 55 MCG NASAL INHALER    2 Sprays daily.      These Medications have changed    No medications on file   Stop Taking    No medications on file

## 2018-08-19 NOTE — ED NOTES
Pt. Ambulated about 30 feet without complications of dizziness or lightheadedness with assistance. Pt. Maintained an adequate heart rate. Pt has Hx of arthritis of the knees and complained of pain while ambulating on his knees, however, he and his family state that is normal for him. MD notified and observed the ambulation. Will continue to monitor.

## 2019-01-17 ENCOUNTER — CLINICAL SUPPORT (OUTPATIENT)
Dept: UROLOGY | Age: 84
End: 2019-01-17

## 2019-01-17 ENCOUNTER — HOSPITAL ENCOUNTER (OUTPATIENT)
Dept: LAB | Age: 84
Discharge: HOME | End: 2019-01-17
Payer: MEDICARE

## 2019-01-17 VITALS
SYSTOLIC BLOOD PRESSURE: 152 MMHG | OXYGEN SATURATION: 97 % | HEIGHT: 69 IN | DIASTOLIC BLOOD PRESSURE: 71 MMHG | HEART RATE: 76 BPM | BODY MASS INDEX: 25.1 KG/M2

## 2019-01-17 DIAGNOSIS — C61 PROSTATE CA (HCC): Primary | ICD-10-CM

## 2019-01-17 DIAGNOSIS — C61 PROSTATE CA (HCC): ICD-10-CM

## 2019-01-17 LAB — PSA SERPL-MCNC: <0.1 NG/ML (ref 0–4)

## 2019-01-17 PROCEDURE — 84153 ASSAY OF PSA TOTAL: CPT

## 2019-01-17 NOTE — PROGRESS NOTES
Kamlesh Pack 80 y.o. male  Prostate Carcinoma 2008/XRT 2011/Androgen Ablation 2015      Edward Morataya seen today for prostate carcinoma follow- androgen ablation therapy with Lupron/Casodex suspended in July 2018 the following 2-year period of edith PSA readings  Patient is doing well and has no complaints-no bone pain no voiding symptoms     No bone pain-voiding well and has no complaints regarding urination at this time      Complaints of mild gynecomastia with mild breast tenderness          On androgen ablation therapy since May 2015 when PSA level 44.4 found on routine assessment      Bone scan negative for metastases June 2015  CT scan imaging of the abdomen pelvis negative for raina disease July 2015  Patient was initially diagnosed with prostate carcinoma in 2008- followed in [facility] office by Dr. Benigno Licona who recommended external beam radiation therapy in 2011-this was declined by the patient After consultation with Dr. Benigno Licona and patient's family- citing belief he would not live long enough to develop metastatic disease-At present, he has irritative and obstructive voiding symptoms but no history of pain in ribs, spine, or limbs      PSA less than 0.1 in January 2011    PSA less than 0.1 in January 2016  PSA 4.8 and 2005  PSA 10.3 2008  PSA 12.1 2009-                                                      Definitive treatment with XRT advised by Dr. Tracie Dumont by patient and family  PSA 15.4 2011                                                       Definitive XRT 2011  PSA less than 0.1 in January 2011  PSA 0.4 in 2015                                                     Androgen ablation therapy initiated 2015  PSA less than 0.1 in January 2016  PSA less than 0.1 in January 2018  PSA less than 0.1 in July 2018                              androgen ablation hiatus initiated July 2018    Review of Systems:    CNS: No seizure syncope headaches or dizziness no visual changes  Respiratory: No wheezing or shortness of breath-Chronic cough  Cardiovascular:Hypertension-No chest pain or palpitations  Intestinal:GERD symptoms/constipation  Urinary: Irritative and obstructive voiding symptoms  Skeletal: Large joint arthritis  Endocrine:No diabetes or thyroid    Other:             Allergies: Allergies   Allergen Reactions    Triamcinolone Acetonide Other (comments)     Other reaction(s): other/intolerance  Other reaction(s): other/intolerance  Nose Bleeds  Other reaction(s): other/intolerance      Medications:    Current Outpatient Medications   Medication Sig Dispense Refill    bicalutamide (CASODEX) 50 mg tablet take 1 tablet by mouth once daily 90 Tab 3    megestrol (MEGACE) 20 mg tablet Take 1 Tab by mouth daily. Indications: malignant neoplasm of the ovary 90 Tab 6    OTHER Hyalgan 20 mg  joint lubricant      sodium hyaluronate (HYALGAN) 10 mg/mL syrg injection 25 mg by Intra artICUlar route once.  simvastatin (ZOCOR) 10 mg tablet Take 20 mg by mouth nightly.  celecoxib (CELEBREX) 100 mg capsule Take  by mouth two (2) times a day.  triamcinolone (NASACORT AQ) 55 mcg nasal inhaler 2 Sprays daily.  diltiazem (CARDIZEM CD) 360 mg ER capsule TAKE ONE CAPSULE BY MOUTH EVERY MORNING 90 Cap 3    potassium chloride (KLOR-CON) 20 mEq packet Take 20 mEq by mouth daily.  lorazepam (ATIVAN) 1 mg tablet Take 1 mg by mouth every eight (8) hours as needed.  POLYETHYLENE GLYCOL 3350 (MIRALAX PO) Take  by mouth as needed.  loratadine (CLARITIN) 10 mg tablet Take 10 mg by mouth as needed. Past Medical History:   Diagnosis Date    Abnormal nuclear cardiac imaging test 04/29/2009    Mild-mod inferior, inferior septal ischemia w/mild scarring. Mild basal inferior, basal septal hypk. EF 55%. Mild TID 1.24. Neg EKG on max EST. Ex time 4 min.     Active tobacco     Allergic rhinitis     Arrhythmia     Arthritis     Atrial fibrillation (HCC)     CAD (coronary artery disease)     cardiomyopathy    Cardiomyopathy     nonischemic    Constipation     COPD     Dyslipidemia     History of echocardiogram 02/06/2013    EF 55-60%. No RWMA. Gr 1 DDfx. No significant valvular pathology.  Hypercholesterolemia     Hypertension     Hypertension     osteoarthritis     Prostate cancer ([address]     S/P cardiac cath 07/16/2009    Patent coronary arteries. LVEDP 10 mmHg. EF 40-45%. Mild global hypk.       Sick sinus syndrome (HCC)     declined pacemaker      Past Surgical History:   Procedure Laterality Date    ENDOSCOPY, COLON, DIAGNOSTIC      cn he thinks 10-30 years ago    HX CATARACT REMOVAL      bilateral    HX ORTHOPAEDIC      steel plate rt forearm tendon repair left hand     Social History     Socioeconomic History    Marital status:      Spouse name: Not on file    Number of children: Not on file    Years of education: Not on file    Highest education level: Not on file   Social Needs    Financial resource strain: Not on file    Food insecurity - worry: Not on file    Food insecurity - inability: Not on file   [employer] needs - medical: Not on file   [employer] needs - non-medical: Not on file   Occupational History    Not on file   Tobacco Use    Smoking status: Former Smoker    Smokeless tobacco: Never Used   Substance and Sexual Activity    Alcohol use: No     Comment: Quit drinking liquor and beer 2000    Drug use: No    Sexual activity: Not on file   Other Topics Concern    Not on file   Social History Narrative    Not on file      Family History   Problem Relation Age of Onset    Heart Attack Mother     Other Father         bleeding bowel problems        Physical Examination: Well-nourished mature male in no apparent distress alert annual and ambulatory    Urinalysis: No specimen today    Impression: Prostate carcinoma 8 years status post XRT on androgen ablation hiatus        Plan: PSA today    Described discussed prospect of intermittent antiandrogen therapy/rationale and expected results    RTC 6 months-PSA PVR      More than 1/2 of this 15 minute visit was spent in counselling and coordination of care, as described above. Mitul Craft MD  -electronically signed-    PLEASE NOTE:  This document has been produced using voice recognition software. Unrecognized errors in transcription may be present.

## 2019-01-17 NOTE — PATIENT INSTRUCTIONS
Prostate Cancer Screening: Care Instructions  Your Care Instructions    The prostate gland is an organ found just below a man's bladder. It is the size and shape of a walnut. It surrounds the tube that carries urine from the bladder out of the body through the penis. This tube is called the urethra. Prostate cancer is the abnormal growth of cells in the prostate. It is the second most common type of cancer in men. (Skin cancer is the most common.)  Most cases of prostate cancer occur in men older than 72. The disease runs in families. And it's more common in -American men. When it's found and treated early, prostate cancer may be cured. But it is not always treated. This is because prostate cancer may not shorten your life, especially if you are older and the cancer is growing slowly. Follow-up care is a key part of your treatment and safety. Be sure to make and go to all appointments, and call your doctor if you are having problems. It's also a good idea to know your test results and keep a list of the medicines you take. What are the screening tests for prostate cancer? The main screening test for prostate cancer is the prostate-specific antigen (PSA) test. This is a blood test that measures how much PSA is in your blood. A high level may mean that you have an enlargement, an infection, or cancer. Along with the PSA test, you may have a digital rectal exam. The digital (finger) rectal exam checks for anything abnormal in your prostate. To do the exam, the doctor puts a lubricated, gloved finger into your rectum. If these tests suggest cancer, you may need a prostate biopsy. How is prostate cancer diagnosed? In a biopsy, the doctor takes small tissue samples from your prostate gland. Another doctor then looks at the tissue under a microscope to see if there are cancer cells, signs of infection, or other problems. The results help diagnose prostate cancer.   What are the pros and cons of screening? Neither a PSA test nor a digital rectal exam can tell you for sure that you do or do not have cancer. But they can help you decide if you need more tests, such as a prostate biopsy. Screening tests may be useful because most men with prostate cancer don't have symptoms. It can be hard to know if you have cancer until it is more advanced. And then it's harder to treat. But having a PSA test can also cause harm. The test may show high levels of PSA that aren't caused by cancer. So you could have a prostate biopsy you didn't need. Or the PSA test might be normal when there is cancer, so a cancer might not be found early. The test can also find cancers that would never have caused a problem during your lifetime. So you might have treatment that was not needed. Prostate cancer usually develops late in life and grows slowly. For many men, it does not shorten their lives. Some experts advise screening only for men who are at high risk. Talk with your doctor to see if screening is right for you. Where can you learn more? Go to http://ovidio-chico.info/. Enter R550 in the search box to learn more about \"Prostate Cancer Screening: Care Instructions. \"  Current as of: March 28, 2018  Content Version: 11.8  © 9000-8549 [employer], Incorporated. Care instructions adapted under license by [employer] (which disclaims liability or warranty for this information). If you have questions about a medical condition or this instruction, always ask your healthcare professional. [place] [address] any warranty or liability for your use of this information.

## 2019-01-17 NOTE — PROGRESS NOTES
Mr. Gay Nunez has a reminder for a \"due or due soon\" health maintenance. I have asked that he contact his primary care provider for follow-up on this health maintenance. RBV Per Dr. Carlos A Pyle draw lab today for PSA for Prostate CA.

## 2019-06-19 RX ORDER — BICALUTAMIDE 50 MG/1
TABLET, FILM COATED ORAL
Qty: 90 TAB | Refills: 3 | Status: SHIPPED | OUTPATIENT
Start: 2019-06-19

## 2019-07-18 ENCOUNTER — HOSPITAL ENCOUNTER (OUTPATIENT)
Dept: LAB | Age: 84
Discharge: HOME | End: 2019-07-18
Payer: MEDICARE

## 2019-07-18 ENCOUNTER — CLINICAL SUPPORT (OUTPATIENT)
Dept: UROLOGY | Age: 84
End: 2019-07-18

## 2019-07-18 VITALS
SYSTOLIC BLOOD PRESSURE: 131 MMHG | DIASTOLIC BLOOD PRESSURE: 72 MMHG | HEART RATE: 68 BPM | OXYGEN SATURATION: 99 % | BODY MASS INDEX: 25.1 KG/M2 | HEIGHT: 69 IN

## 2019-07-18 DIAGNOSIS — C61 PROSTATE CA (HCC): Primary | ICD-10-CM

## 2019-07-18 DIAGNOSIS — C61 PROSTATE CA (HCC): ICD-10-CM

## 2019-07-18 LAB
BILIRUB UR QL STRIP: NEGATIVE
GLUCOSE UR-MCNC: NEGATIVE MG/DL
KETONES P FAST UR STRIP-MCNC: NEGATIVE MG/DL
PH UR STRIP: 5.5 [PH] (ref 4.6–8)
PROT UR QL STRIP: NEGATIVE
PSA SERPL-MCNC: 0 NG/ML (ref 0–4)
SP GR UR STRIP: 1.01 (ref 1–1.03)
UA UROBILINOGEN AMB POC: NORMAL (ref 0.2–1)
URINALYSIS CLARITY POC: CLEAR
URINALYSIS COLOR POC: YELLOW
URINE BLOOD POC: NEGATIVE
URINE LEUKOCYTES POC: NEGATIVE
URINE NITRITES POC: NEGATIVE

## 2019-07-18 PROCEDURE — 36415 COLL VENOUS BLD VENIPUNCTURE: CPT

## 2019-07-18 PROCEDURE — 84153 ASSAY OF PSA TOTAL: CPT

## 2019-07-18 NOTE — PATIENT INSTRUCTIONS
Prostate Cancer Screening: Care Instructions  Your Care Instructions    The prostate gland is an organ found just below a man's bladder. It is the size and shape of a walnut. It surrounds the tube that carries urine from the bladder out of the body through the penis. This tube is called the urethra. Prostate cancer is the abnormal growth of cells in the prostate. It is the second most common type of cancer in men. (Skin cancer is the most common.)  Most cases of prostate cancer occur in men older than 72. The disease runs in families. And it's more common in -American men. When it's found and treated early, prostate cancer may be cured. But it is not always treated. This is because prostate cancer may not shorten your life, especially if you are older and the cancer is growing slowly. Follow-up care is a key part of your treatment and safety. Be sure to make and go to all appointments, and call your doctor if you are having problems. It's also a good idea to know your test results and keep a list of the medicines you take. What are the screening tests for prostate cancer? The main screening test for prostate cancer is the prostate-specific antigen (PSA) test. This is a blood test that measures how much PSA is in your blood. A high level may mean that you have an enlargement, an infection, or cancer. Along with the PSA test, you may have a digital rectal exam. The digital (finger) rectal exam checks for anything abnormal in your prostate. To do the exam, the doctor puts a lubricated, gloved finger into your rectum. If these tests suggest cancer, you may need a prostate biopsy. How is prostate cancer diagnosed? In a biopsy, the doctor takes small tissue samples from your prostate gland. Another doctor then looks at the tissue under a microscope to see if there are cancer cells, signs of infection, or other problems. The results help diagnose prostate cancer.   What are the pros and cons of screening? Neither a PSA test nor a digital rectal exam can tell you for sure that you do or do not have cancer. But they can help you decide if you need more tests, such as a prostate biopsy. Screening tests may be useful because most men with prostate cancer don't have symptoms. It can be hard to know if you have cancer until it is more advanced. And then it's harder to treat. But having a PSA test can also cause harm. The test may show high levels of PSA that aren't caused by cancer. So you could have a prostate biopsy you didn't need. Or the PSA test might be normal when there is cancer, so a cancer might not be found early. The test can also find cancers that would never have caused a problem during your lifetime. So you might have treatment that was not needed. Prostate cancer usually develops late in life and grows slowly. For many men, it does not shorten their lives. Some experts advise screening only for men who are at high risk. Talk with your doctor to see if screening is right for you. Where can you learn more? Go to http://ovidio-chico.info/. Enter R550 in the search box to learn more about \"Prostate Cancer Screening: Care Instructions. \"  Current as of: March 27, 2018  Content Version: 11.9  © 1401-6234 [employer]. Care instructions adapted under license by [employer] (which disclaims liability or warranty for this information). If you have questions about a medical condition or this instruction, always ask your healthcare professional. [place] [address] any warranty or liability for your use of this information.

## 2019-07-18 NOTE — PROGRESS NOTES
Mr. Isacc Maya has a reminder for a \"due or due soon\" health maintenance. I have asked that he contact his primary care provider for follow-up on this health maintenance.

## 2019-07-18 NOTE — PROGRESS NOTES
Ana Kessler 80 y.o. male  Prostate Carcinoma 2008/XRT 2011/Androgen Ablation 2015  [facility] today for prostate carcinoma follow-up on androgen ablation therapy with Lupron/Casodex which was suspended in July 2018 the following 2-year period of edith PSA readings  Patient is doing well and has no complaints-no bone pain no voiding symptoms     No bone pain-voiding well and has no complaints regarding urination at this time      Complaints of mild gynecomastia with mild breast tenderness          On androgen ablation therapy since May 2015 when PSA level 44.4 found on routine assessment      Bone scan negative for metastases June 2015  CT scan imaging of the abdomen pelvis negative for raina disease July 2015  Patient was initially diagnosed with prostate carcinoma in 2008-patient is doing well voiding with a solid but weak stream daytime frequency 6-8 episodes of nocturia 2-3 per night no dysuria no gross hematuria          followed in [facility] office by Dr. Dalia Sharma who recommended external beam radiation therapy in 2011-this was declined by the patient After consultation with Dr. Dalia Sharma and patient's family- citing belief he would not live long enough to develop metastatic disease-At present, he has irritative and obstructive voiding symptoms but no history of pain in ribs, spine, or limbs      PSA less than 0.1 in January 2011    PSA less than 0.1 in January 2016  PSA 4.8 and 2005  PSA 10.3 2008  PSA 12.1 2009-                                                      Definitive treatment with XRT advised by Dr. Jenni James by patient and family  PSA 15.4 2011                                                       Definitive XRT 2011  PSA less than 0.1 in January 2011  PSA 0.4 in 2015 [facility] ablation therapy initiated 2015  PSA less than 0.1 in January 2016  PSA less than 0.1 in January 2018  PSA less than 0.1 in July 2018 androgen ablation hiatus initiated July 2018  PSA less than 0.1 in January 2018     Review of Systems:    CNS: No seizure syncope headaches or dizziness no visual changes  Respiratory: No wheezing or shortness of breath-Chronic cough  Cardiovascular:Hypertension-No chest pain or palpitations  Intestinal:GERD symptoms/constipation  Urinary: Irritative and obstructive voiding symptoms  Skeletal: Large joint arthritis  Endocrine:No diabetes or thyroid    Other:                     Allergies: Allergies   Allergen Reactions    Triamcinolone Acetonide Other (comments)     Other reaction(s): other/intolerance  Other reaction(s): other/intolerance  Nose Bleeds  Other reaction(s): other/intolerance      Medications:    Current Outpatient Medications   Medication Sig Dispense Refill    bicalutamide (CASODEX) 50 mg tablet take 1 tablet by mouth once daily 90 Tab 3    megestrol (MEGACE) 20 mg tablet Take 1 Tab by mouth daily. Indications: malignant neoplasm of the ovary 90 Tab 6    OTHER Hyalgan 20 mg  joint lubricant      sodium hyaluronate (HYALGAN) 10 mg/mL syrg injection 25 mg by Intra artICUlar route once.  simvastatin (ZOCOR) 10 mg tablet Take 20 mg by mouth nightly.  celecoxib (CELEBREX) 100 mg capsule Take  by mouth two (2) times a day.  triamcinolone (NASACORT AQ) 55 mcg nasal inhaler 2 Sprays daily.  diltiazem (CARDIZEM CD) 360 mg ER capsule TAKE ONE CAPSULE BY MOUTH EVERY MORNING 90 Cap 3    potassium chloride (KLOR-CON) 20 mEq packet Take 20 mEq by mouth daily.  lorazepam (ATIVAN) 1 mg tablet Take 1 mg by mouth every eight (8) hours as needed.  POLYETHYLENE GLYCOL 3350 (MIRALAX PO) Take  by mouth as needed.  loratadine (CLARITIN) 10 mg tablet Take 10 mg by mouth as needed. Past Medical History:   Diagnosis Date    Abnormal nuclear cardiac imaging test 04/29/2009    Mild-mod inferior, inferior septal ischemia w/mild scarring.   Mild basal inferior, basal septal hypk. EF 55%. Mild TID 1.24. Neg EKG on max EST. Ex time 4 min.  Active tobacco     Allergic rhinitis     Arrhythmia     Arthritis     Atrial fibrillation (HCC)     CAD (coronary artery disease)     cardiomyopathy    Cardiomyopathy     nonischemic    Constipation     COPD     Dyslipidemia     History of echocardiogram 02/06/2013    EF 55-60%. No RWMA. Gr 1 DDfx. No significant valvular pathology.  Hypercholesterolemia     Hypertension     Hypertension     osteoarthritis     Prostate cancer ([address]     S/P cardiac cath 07/16/2009    Patent coronary arteries. LVEDP 10 mmHg. EF 40-45%. Mild global hypk.       Sick sinus syndrome (HCC)     declined pacemaker      Past Surgical History:   Procedure Laterality Date    ENDOSCOPY, COLON, DIAGNOSTIC      cn he thinks 10-30 years ago    HX CATARACT REMOVAL      bilateral    HX ORTHOPAEDIC      steel plate rt forearm tendon repair left hand     Social History     Socioeconomic History    Marital status:      Spouse name: Not on file    Number of children: Not on file    Years of education: Not on file    Highest education level: Not on file   Occupational History    Not on file   Social Needs    Financial resource strain: Not on file    Food insecurity:     Worry: Not on file     Inability: Not on file    Transportation needs:     Medical: Not on file     Non-medical: Not on file   Tobacco Use    Smoking status: Former Smoker    Smokeless tobacco: Never Used   Substance and Sexual Activity    Alcohol use: No     Comment: Quit drinking liquor and beer 2000    Drug use: No    Sexual activity: Not on file   Lifestyle    Physical activity:     Days per week: Not on file     Minutes per session: Not on file    Stress: Not on file   Relationships    Social connections:     Talks on phone: Not on file     Gets together: Not on file     Attends Sikh service: Not on file     Active member of club or organization: Not on file     Attends meetings of clubs or organizations: Not on file     Relationship status: Not on file    Intimate partner violence:     Fear of current or ex partner: Not on file     Emotionally abused: Not on file     Physically abused: Not on file     Forced sexual activity: Not on file   Other Topics Concern    Not on file   Social History Narrative    Not on file      Family History   Problem Relation Age of Onset    Heart Attack Mother     Other Father         bleeding bowel problems        Physical Examination: Well-nourished trim and fit appearing adult male in no apparent distress-ambulating without assistance of walker or cane      Urinalysis: no specimen today      Impression: Prostate carcinoma responding favorably to intermittent androgen ablation therapy      Plan: PSA today    Withhold resumption of Lupron anti-antigen therapy until PSA rise detected  on surveillance testing    RTC 6-month PSA testing        More than 1/2 of this 15 minute visit was spent in counselling and coordination of care, as described above. Jean-Paul Hernandez MD    -electronically signed-    PLEASE NOTE:  This document has been produced using voice recognition software. Unrecognized errors in transcription may be present.

## 2024-04-01 ENCOUNTER — APPOINTMENT (OUTPATIENT)
Facility: HOSPITAL | Age: 89
End: 2024-04-01
Payer: MEDICARE

## 2024-04-01 ENCOUNTER — HOSPITAL ENCOUNTER (EMERGENCY)
Facility: HOSPITAL | Age: 89
Discharge: HOME | End: 2024-04-02
Attending: STUDENT IN AN ORGANIZED HEALTH CARE EDUCATION/TRAINING PROGRAM
Payer: MEDICARE

## 2024-04-01 VITALS
SYSTOLIC BLOOD PRESSURE: 127 MMHG | OXYGEN SATURATION: 97 % | RESPIRATION RATE: 18 BRPM | TEMPERATURE: 97.1 F | HEART RATE: 80 BPM | DIASTOLIC BLOOD PRESSURE: 57 MMHG

## 2024-04-01 DIAGNOSIS — K40.90 NON-RECURRENT UNILATERAL INGUINAL HERNIA WITHOUT OBSTRUCTION OR GANGRENE: Primary | ICD-10-CM

## 2024-04-01 LAB
ALBUMIN SERPL-MCNC: 3.2 G/DL (ref 3.4–5)
ALBUMIN/GLOB SERPL: 1 (ref 0.8–1.7)
ALP SERPL-CCNC: 79 U/L (ref 45–117)
ALT SERPL-CCNC: 29 U/L (ref 16–61)
ANION GAP SERPL CALC-SCNC: 2 MMOL/L (ref 3–18)
ANION GAP SERPL CALC-SCNC: 2 MMOL/L (ref 3–18)
APPEARANCE UR: CLEAR
AST SERPL-CCNC: 47 U/L (ref 10–38)
BASOPHILS # BLD: 0 K/UL (ref 0–0.1)
BASOPHILS NFR BLD: 1 % (ref 0–2)
BILIRUB SERPL-MCNC: 1 MG/DL (ref 0.2–1)
BILIRUB UR QL: NEGATIVE
BUN SERPL-MCNC: 20 MG/DL (ref 7–18)
BUN SERPL-MCNC: 21 MG/DL (ref 7–18)
BUN/CREAT SERPL: 24 (ref 12–20)
BUN/CREAT SERPL: 25 (ref 12–20)
CALCIUM SERPL-MCNC: 8.5 MG/DL (ref 8.5–10.1)
CALCIUM SERPL-MCNC: 8.7 MG/DL (ref 8.5–10.1)
CHLORIDE SERPL-SCNC: 102 MMOL/L (ref 100–111)
CHLORIDE SERPL-SCNC: 104 MMOL/L (ref 100–111)
CO2 SERPL-SCNC: 31 MMOL/L (ref 21–32)
CO2 SERPL-SCNC: 33 MMOL/L (ref 21–32)
COLOR UR: YELLOW
CREAT SERPL-MCNC: 0.83 MG/DL (ref 0.6–1.3)
CREAT SERPL-MCNC: 0.85 MG/DL (ref 0.6–1.3)
DIFFERENTIAL METHOD BLD: NORMAL
EOSINOPHIL # BLD: 0.1 K/UL (ref 0–0.4)
EOSINOPHIL NFR BLD: 2 % (ref 0–5)
ERYTHROCYTE [DISTWIDTH] IN BLOOD BY AUTOMATED COUNT: 14.5 % (ref 11.6–14.5)
GLOBULIN SER CALC-MCNC: 3.3 G/DL (ref 2–4)
GLUCOSE SERPL-MCNC: 86 MG/DL (ref 74–99)
GLUCOSE SERPL-MCNC: 90 MG/DL (ref 74–99)
GLUCOSE UR STRIP.AUTO-MCNC: NEGATIVE MG/DL
HCT VFR BLD AUTO: 42.7 % (ref 36–48)
HGB BLD-MCNC: 14.1 G/DL (ref 13–16)
HGB UR QL STRIP: NEGATIVE
IMM GRANULOCYTES # BLD AUTO: 0 K/UL (ref 0–0.04)
IMM GRANULOCYTES NFR BLD AUTO: 0 % (ref 0–0.5)
KETONES UR QL STRIP.AUTO: NEGATIVE MG/DL
LEUKOCYTE ESTERASE UR QL STRIP.AUTO: NEGATIVE
LYMPHOCYTES # BLD: 1.2 K/UL (ref 0.9–3.6)
LYMPHOCYTES NFR BLD: 21 % (ref 21–52)
MAGNESIUM SERPL-MCNC: 2.1 MG/DL (ref 1.6–2.6)
MCH RBC QN AUTO: 32.3 PG (ref 24–34)
MCHC RBC AUTO-ENTMCNC: 33 G/DL (ref 31–37)
MCV RBC AUTO: 97.9 FL (ref 78–100)
MONOCYTES # BLD: 0.4 K/UL (ref 0.05–1.2)
MONOCYTES NFR BLD: 7 % (ref 3–10)
NEUTS SEG # BLD: 3.9 K/UL (ref 1.8–8)
NEUTS SEG NFR BLD: 69 % (ref 40–73)
NITRITE UR QL STRIP.AUTO: NEGATIVE
NRBC # BLD: 0 K/UL (ref 0–0.01)
NRBC BLD-RTO: 0 PER 100 WBC
NT PRO BNP: 1175 PG/ML (ref 0–1800)
PH UR STRIP: 6.5 (ref 5–8)
PLATELET # BLD AUTO: 216 K/UL (ref 135–420)
PMV BLD AUTO: 10.1 FL (ref 9.2–11.8)
POTASSIUM SERPL-SCNC: 4.1 MMOL/L (ref 3.5–5.5)
POTASSIUM SERPL-SCNC: ABNORMAL MMOL/L (ref 3.5–5.5)
PROT SERPL-MCNC: 6.5 G/DL (ref 6.4–8.2)
PROT UR STRIP-MCNC: NEGATIVE MG/DL
RBC # BLD AUTO: 4.36 M/UL (ref 4.35–5.65)
SODIUM SERPL-SCNC: 137 MMOL/L (ref 136–145)
SODIUM SERPL-SCNC: 137 MMOL/L (ref 136–145)
SP GR UR REFRACTOMETRY: 1.01 (ref 1–1.03)
TROPONIN I SERPL HS-MCNC: 95 NG/L (ref 0–78)
TROPONIN I SERPL HS-MCNC: 96 NG/L (ref 0–78)
UROBILINOGEN UR QL STRIP.AUTO: 1 EU/DL (ref 0.2–1)
WBC # BLD AUTO: 5.6 K/UL (ref 4.6–13.2)

## 2024-04-01 PROCEDURE — 85025 COMPLETE CBC W/AUTO DIFF WBC: CPT

## 2024-04-01 PROCEDURE — 99285 EMERGENCY DEPT VISIT HI MDM: CPT

## 2024-04-01 PROCEDURE — 70450 CT HEAD/BRAIN W/O DYE: CPT

## 2024-04-01 PROCEDURE — 6360000004 HC RX CONTRAST MEDICATION: Performed by: STUDENT IN AN ORGANIZED HEALTH CARE EDUCATION/TRAINING PROGRAM

## 2024-04-01 PROCEDURE — 83735 ASSAY OF MAGNESIUM: CPT

## 2024-04-01 PROCEDURE — 74177 CT ABD & PELVIS W/CONTRAST: CPT

## 2024-04-01 PROCEDURE — 83880 ASSAY OF NATRIURETIC PEPTIDE: CPT

## 2024-04-01 PROCEDURE — 81003 URINALYSIS AUTO W/O SCOPE: CPT

## 2024-04-01 PROCEDURE — 71045 X-RAY EXAM CHEST 1 VIEW: CPT

## 2024-04-01 PROCEDURE — 84484 ASSAY OF TROPONIN QUANT: CPT

## 2024-04-01 PROCEDURE — 80053 COMPREHEN METABOLIC PANEL: CPT

## 2024-04-01 PROCEDURE — 93005 ELECTROCARDIOGRAM TRACING: CPT | Performed by: STUDENT IN AN ORGANIZED HEALTH CARE EDUCATION/TRAINING PROGRAM

## 2024-04-01 RX ADMIN — IOPAMIDOL 100 ML: 612 INJECTION, SOLUTION INTRAVENOUS at 20:10

## 2024-04-01 ASSESSMENT — PAIN - FUNCTIONAL ASSESSMENT: PAIN_FUNCTIONAL_ASSESSMENT: NONE - DENIES PAIN

## 2024-04-01 NOTE — ED TRIAGE NOTES
Pt arrives via EMS from home c/o possible syncopal episode in bed. Wife said pt had an unresponsive episode. Pt was alert upon EMS arrival just slow to respond. . Stroke scale neg per EMS.     Hx HTN

## 2024-04-01 NOTE — ED NOTES
PATIENT LEFT LYING IN BED, RELATIVE PRESENT  BREATHING EVIDENT, NO COMPLAINT VOICED. HAND OVER REPORT GIVEN TO NIGHT RN, CARE CONTINUES.

## 2024-04-01 NOTE — ED PROVIDER NOTES
[facility] EMERGENCY DEPT  EMERGENCY DEPARTMENT ENCOUNTER      Pt Name: Dillon Mcneal  MRN: [id number]  Birthdate 6/21/1931  Date of evaluation: 4/1/2024  Provider: Zenaida Keith MD    CHIEF COMPLAINT       Chief Complaint   Patient presents with    Loss of Consciousness         HISTORY OF PRESENT ILLNESS   (Location/Symptom, Timing/Onset, Context/Setting, Quality, Duration, Modifying Factors, Severity)  Note limiting factors.   Dillon Mcneal is a 89 y.o. male who presents to the emergency department by EMS.  Initial report from EMS was that patient had a syncopal episode and was unresponsive however patient does not have any memory of this.  He states that he was just laying in bed and could not get up but is otherwise fine.  His main complaint is pain to his right inguinal region.  States has been bothering him for some time.  History obtained from patient's wife over the phone.  Patient states that he was never unresponsive however she went to get him up from bed and he was unable to get up.  Patient states this was because of pain in his abdomen.  Denies any chest pain, shortness of breath, palpitations or dizziness.  Denies any loss of consciousness.  Denies any bowel or bladder incontinence.  Denies any dysuria, hematuria or increased urinary frequency.  States he still having regular bowel movements.  Denies any nausea or vomiting.     Nursing Notes were reviewed.    REVIEW OF SYSTEMS    (2-9 systems for level 4, 10 or more for level 5)     Constitutional: No fever  HENT: No ear pain  Eyes: No change in vision  Respiratory: No SOB  Cardio: No chest pain  GI: No blood in stool  : No hematuria  MSK: No back pain  Skin: No rashes  Neuro: No headache    Except as noted above the remainder of the review of systems was reviewed and negative.       PAST MEDICAL HISTORY     Past Medical History:   Diagnosis Date    Abnormal nuclear cardiac imaging test 04/29/2009    Mild-mod inferior, inferior septal ischemia  127/57 97.1 °F (36.2 °C) Oral 80 18 97 %         Physical Exam    General: [No acute distress]  Head: [Normocephalic, atraumatic]  Psych: [cooperative and alert]  Eyes: [No scleral icterus, normal conjunctiva]  ENT: [moist oral mucosa]  Neck: [supple]  CV: [regular rate and rhythm, no pitting edema, palpable radial pulses]  Pulm: [clear breath sounds bilaterally without any wheezing or rhonchi, normal respiratory rate]  GI: [normal bowel sounds, soft, non-tender]  MSK: [moves all four extremities]  Skin: [no rashes]  Neuro: [alert and conversive]       DIAGNOSTIC RESULTS     EKG: All EKG's are interpreted by the Emergency Department Physician who either signs or Co-signs this chart in the absence of a cardiologist.    [ ]    RADIOLOGY:   Non-plain film images such as CT, Ultrasound and MRI are read by the radiologist. Plain radiographic images are visualized and preliminarily interpreted by the emergency physician with the below findings:    [ ]    Interpretation per the Radiologist below, if available at the time of this note:    XR CHEST PORTABLE   Final Result      No acute airspace disease.         CT Head W/O Contrast    (Results Pending)   CT ABDOMEN PELVIS W IV CONTRAST Additional Contrast? None    (Results Pending)         ED BEDSIDE ULTRASOUND:   Performed by ED Physician - none    LABS:  Labs Reviewed   CBC WITH AUTO DIFFERENTIAL   URINALYSIS   TROPONIN   MAGNESIUM   COMPREHENSIVE METABOLIC PANEL   BRAIN NATRIURETIC PEPTIDE       All other labs were within normal range or not returned as of this dictation.    EMERGENCY DEPARTMENT COURSE and DIFFERENTIAL DIAGNOSIS/MDM:   Vitals:    Vitals:    04/01/24 1721 04/01/24 1728   BP: (!) 127/57    Pulse: 80    Resp: 18    Temp:  97.1 °F (36.2 °C)   TempSrc:  Oral   SpO2: 97%        Medical Decision Making  Amount and/or Complexity of Data Reviewed  Labs: ordered.  Radiology: ordered.  ECG/medicine tests: ordered.                 CRITICAL CARE TIME   Total Critical

## 2024-04-02 LAB
EKG DIAGNOSIS: NORMAL
EKG Q-T INTERVAL: 374 MS
EKG QRS DURATION: 100 MS
EKG QTC CALCULATION (BAZETT): 474 MS
EKG R AXIS: 32 DEGREES
EKG T AXIS: 54 DEGREES
EKG VENTRICULAR RATE: 97 BPM

## 2024-04-02 PROCEDURE — 93010 ELECTROCARDIOGRAM REPORT: CPT | Performed by: INTERNAL MEDICINE

## 2024-04-02 NOTE — ED NOTES
Patient incontinent of urine. Refusing to be changed. States that he will change when he gets home.